# Patient Record
Sex: MALE | Race: WHITE | URBAN - METROPOLITAN AREA
[De-identification: names, ages, dates, MRNs, and addresses within clinical notes are randomized per-mention and may not be internally consistent; named-entity substitution may affect disease eponyms.]

---

## 2020-06-24 ENCOUNTER — HOSPITAL ENCOUNTER (OUTPATIENT)
Age: 50
Setting detail: SPECIMEN
Discharge: HOME | End: 2020-06-24
Payer: COMMERCIAL

## 2020-06-24 LAB — SARS-COV-2 ANTIBODY, TOTAL: NEGATIVE

## 2022-11-07 ENCOUNTER — NURSE TRIAGE (OUTPATIENT)
Dept: OTHER | Facility: CLINIC | Age: 52
End: 2022-11-07

## 2023-01-28 ENCOUNTER — HOSPITAL ENCOUNTER (EMERGENCY)
Age: 53
Discharge: HOME | End: 2023-01-28
Attending: EMERGENCY MEDICINE
Payer: COMMERCIAL

## 2023-01-28 ENCOUNTER — APPOINTMENT (OUTPATIENT)
Dept: CT IMAGING | Age: 53
End: 2023-01-28
Payer: COMMERCIAL

## 2023-01-28 VITALS
RESPIRATION RATE: 16 BRPM | WEIGHT: 185 LBS | DIASTOLIC BLOOD PRESSURE: 86 MMHG | HEART RATE: 64 BPM | BODY MASS INDEX: 26.48 KG/M2 | SYSTOLIC BLOOD PRESSURE: 150 MMHG | HEIGHT: 70 IN | TEMPERATURE: 97.8 F | OXYGEN SATURATION: 98 %

## 2023-01-28 DIAGNOSIS — R22.1 NECK MASS: Primary | ICD-10-CM

## 2023-01-28 LAB
ABSOLUTE EOS #: 0.1 K/UL (ref 0–0.4)
ABSOLUTE LYMPH #: 1 K/UL (ref 1–4.8)
ABSOLUTE MONO #: 0.4 K/UL (ref 0.1–1.2)
ALBUMIN SERPL-MCNC: 4.3 G/DL (ref 3.5–5.2)
ALBUMIN/GLOBULIN RATIO: 1.7 (ref 1–2.5)
ALP BLD-CCNC: 59 U/L (ref 40–129)
ALT SERPL-CCNC: 19 U/L (ref 5–41)
ANION GAP SERPL CALCULATED.3IONS-SCNC: 10 MMOL/L (ref 9–17)
AST SERPL-CCNC: 18 U/L
BASOPHILS # BLD: 1 % (ref 0–2)
BASOPHILS ABSOLUTE: 0 K/UL (ref 0–0.2)
BILIRUB SERPL-MCNC: 0.2 MG/DL (ref 0.3–1.2)
BUN BLDV-MCNC: 19 MG/DL (ref 6–20)
CALCIUM SERPL-MCNC: 8.8 MG/DL (ref 8.6–10.4)
CHLORIDE BLD-SCNC: 105 MMOL/L (ref 98–107)
CO2: 24 MMOL/L (ref 20–31)
CREAT SERPL-MCNC: 0.93 MG/DL (ref 0.7–1.2)
EOSINOPHILS RELATIVE PERCENT: 2 % (ref 1–4)
GFR SERPL CREATININE-BSD FRML MDRD: >60 ML/MIN/1.73M2
GLUCOSE BLD-MCNC: 131 MG/DL (ref 70–99)
HCT VFR BLD CALC: 41.8 % (ref 41–53)
HEMOGLOBIN: 13.8 G/DL (ref 13.5–17.5)
LYMPHOCYTES # BLD: 21 % (ref 24–44)
MCH RBC QN AUTO: 30.2 PG (ref 26–34)
MCHC RBC AUTO-ENTMCNC: 33 G/DL (ref 31–37)
MCV RBC AUTO: 91.5 FL (ref 80–100)
MONOCYTES # BLD: 8 % (ref 2–11)
PDW BLD-RTO: 15 % (ref 12.5–15.4)
PLATELET # BLD: 214 K/UL (ref 140–450)
PMV BLD AUTO: 8.2 FL (ref 6–12)
POTASSIUM SERPL-SCNC: 3.8 MMOL/L (ref 3.7–5.3)
RBC # BLD: 4.57 M/UL (ref 4.5–5.9)
SEG NEUTROPHILS: 68 % (ref 36–66)
SEGMENTED NEUTROPHILS ABSOLUTE COUNT: 3.1 K/UL (ref 1.8–7.7)
SODIUM BLD-SCNC: 139 MMOL/L (ref 135–144)
TOTAL PROTEIN: 6.9 G/DL (ref 6.4–8.3)
WBC # BLD: 4.7 K/UL (ref 3.5–11)

## 2023-01-28 PROCEDURE — 6360000004 HC RX CONTRAST MEDICATION: Performed by: EMERGENCY MEDICINE

## 2023-01-28 PROCEDURE — 85025 COMPLETE CBC W/AUTO DIFF WBC: CPT

## 2023-01-28 PROCEDURE — 70491 CT SOFT TISSUE NECK W/DYE: CPT

## 2023-01-28 PROCEDURE — 36415 COLL VENOUS BLD VENIPUNCTURE: CPT

## 2023-01-28 PROCEDURE — 80053 COMPREHEN METABOLIC PANEL: CPT

## 2023-01-28 PROCEDURE — 99285 EMERGENCY DEPT VISIT HI MDM: CPT

## 2023-01-28 PROCEDURE — 2580000003 HC RX 258: Performed by: EMERGENCY MEDICINE

## 2023-01-28 RX ORDER — 0.9 % SODIUM CHLORIDE 0.9 %
80 INTRAVENOUS SOLUTION INTRAVENOUS ONCE
Status: DISCONTINUED | OUTPATIENT
Start: 2023-01-28 | End: 2023-01-28 | Stop reason: HOSPADM

## 2023-01-28 RX ORDER — DOXYCYCLINE HYCLATE 50 MG/1
50 CAPSULE ORAL 2 TIMES DAILY
COMMUNITY

## 2023-01-28 RX ORDER — SODIUM CHLORIDE 0.9 % (FLUSH) 0.9 %
10 SYRINGE (ML) INJECTION PRN
Status: DISCONTINUED | OUTPATIENT
Start: 2023-01-28 | End: 2023-01-28 | Stop reason: HOSPADM

## 2023-01-28 RX ADMIN — Medication 80 ML: at 15:50

## 2023-01-28 RX ADMIN — SODIUM CHLORIDE, PRESERVATIVE FREE 10 ML: 5 INJECTION INTRAVENOUS at 15:50

## 2023-01-28 RX ADMIN — IOPAMIDOL 75 ML: 755 INJECTION, SOLUTION INTRAVENOUS at 15:49

## 2023-01-28 ASSESSMENT — ENCOUNTER SYMPTOMS
GASTROINTESTINAL NEGATIVE: 1
RESPIRATORY NEGATIVE: 1
EYES NEGATIVE: 1

## 2023-01-28 ASSESSMENT — LIFESTYLE VARIABLES: HOW OFTEN DO YOU HAVE A DRINK CONTAINING ALCOHOL: 4 OR MORE TIMES A WEEK

## 2023-01-28 ASSESSMENT — PAIN - FUNCTIONAL ASSESSMENT: PAIN_FUNCTIONAL_ASSESSMENT: NONE - DENIES PAIN

## 2023-01-28 NOTE — ED PROVIDER NOTES
[facility] Emergency Department  [id number] [facility] [address] [facility] [address]  Phone: [phone]  Fax: [phone]        Pt Name: Julia Holman  MRN: [id number]  [place] 1970  Date of evaluation: 23    [address] [place] [address]       Chief Complaint   Patient presents with    Neck Swelling       HISTORY OF PRESENT ILLNESS (Location/Symptom, Timing/Onset, Context/Setting, Quality, Duration, Modifying Factors, Severity)      Julia Holman is a 46 y.o. male with no pertinent PMH who presents to the ED via private auto with complaints of left-sided neck swelling which started about 2 days ago. Patient states that he does have a history of cystic acne, and he had popped a pimple behind his left side, on his neck. He states that is since healed. He denies any recent illness. Denies any sore throat. Denies any fever chills or body aches. Denies any headache dizziness or lightheadedness. He states is never had swelling like this in the past.  He does still have his tonsils. He denies any trouble swallowing or trouble breathing. Patient has not taken any medication for symptoms at this time. PAST MEDICAL / SURGICAL / SOCIAL / FAMILY HISTORY     PMH:  has a past medical history of Solitary kidney, congenital.  Surgical History:  has a past surgical history that includes Achilles tendon surgery (Right). Social History:  reports that he has never smoked. He has never used smokeless tobacco. He reports current alcohol use. Family History: He indicated that his mother is alive. He indicated that his father is . He indicated that both of his brothers are alive. He indicated that his maternal grandmother is . He indicated that his maternal grandfather is . He indicated that his paternal grandmother is . He indicated that his paternal grandfather is . family history includes Cancer in his father.   Psychiatric History: None    Allergies: Patient has no known allergies. Home Medications:   Prior to Admission medications    Medication Sig Start Date End Date Taking? Authorizing Provider   doxycycline (VIBRAMYCIN) 50 MG capsule Take 50 mg by mouth 2 times daily   Yes Historical Provider, MD   tamsulosin (FLOMAX) 0.4 MG capsule Take 2 capsules by mouth daily. 5/20/13 5/20/14  Emma Cisneros, APRN - CNP       REVIEW OF SYSTEMS  (2-9 systems for level 4, 10 ormore for level 5)      Review of Systems   Constitutional: Negative. HENT: Negative. Eyes: Negative. Respiratory: Negative. Cardiovascular: Negative. Gastrointestinal: Negative. Endocrine: Negative. Genitourinary: Negative. Musculoskeletal: Negative. Skin:         Left-sided neck and facial swelling   Neurological: Negative. Hematological: Negative. Psychiatric/Behavioral: Negative. All other systems negative except as marked. PHYSICAL EXAM  (up to 7 for level 4, 8 or more for level 5)      INITIAL VITALS:  height is 5' 10\" (1.778 m) and weight is 83.9 kg (185 lb). His oral temperature is 97.8 °F (36.6 °C). His blood pressure is 150/86 (abnormal) and his pulse is 64. His respiration is 16 and oxygen saturation is 98%. Vital signs reviewed. Physical Exam  Constitutional:       Appearance: Normal appearance. HENT:      Head: Normocephalic. Right Ear: Tympanic membrane, ear canal and external ear normal.      Left Ear: Tympanic membrane, ear canal and external ear normal.      Nose: Nose normal.      Mouth/Throat:      Mouth: Mucous membranes are moist.      Pharynx: Oropharynx is clear. Eyes:      Extraocular Movements: Extraocular movements intact. Conjunctiva/sclera: Conjunctivae normal.      Pupils: Pupils are equal, round, and reactive to light. Cardiovascular:      Rate and Rhythm: Normal rate and regular rhythm. Pulses: Normal pulses. Heart sounds: Normal heart sounds.    Pulmonary:      Effort: Pulmonary effort is normal.      Breath sounds: Normal breath sounds. Abdominal:      General: Bowel sounds are normal. There is no distension. Palpations: Abdomen is soft. Tenderness: There is no abdominal tenderness. There is no guarding. Musculoskeletal:         General: Normal range of motion. Cervical back: Normal range of motion. Skin:     General: Skin is warm and dry. Capillary Refill: Capillary refill takes less than 2 seconds. Findings: Erythema present. Neurological:      Mental Status: He is alert and oriented to person, place, and time. Psychiatric:         Mood and Affect: Mood normal.         Behavior: Behavior normal.         Thought Content: Thought content normal.         Judgment: Judgment normal.         DIFFERENTIAL DIAGNOSIS / MDM     Upon exam, He appears nontoxic and no distress is noted. Heart sounds within normal limits upon auscultation. Lung sounds are clear and equal bilaterally. Bowel sounds are present in all 4 quadrants. No abdominal distention tenderness or guarding is noted. Upon examination, he does have some swelling noted on the left side of his face, kind of over the parotid gland as well as over the left mandible submandibular glands. There is some erythema noted as well. Skin temp is appropriate. Oropharynx within normal limits, there are no lesions swelling or abscess noted. Patient's teeth are intact, no obvious dental abscess or decay is noted. I will order a soft tissue CT scan of the neck as well as some blood work and reassess. Patient denies need for any pain medication at this time is resting comfortably in his room. CT saying that there is a 16mm cystic left neck level 2 mass, presumed metastatic squamous cell carcinoma per just read. Spoke with Dr. Meghna Wells with oncology who is able like to see the patient in the office this week. Visit I will discuss the results with the patient and his significant other.   All of the questions were answered. Patient and his significant other state they will call oncologist office worsening Monday morning. Patient continues denies need for any pain medication at this time. Please return to emergency part with any trouble swallowing, drooling, respiratory distress, intractable nausea or vomiting, or any other new concerning or worsening symptoms. Patient and his significant other state and understanding of education and patient stable for outpatient follow-up this time. PLAN (LABS / IMAGING / EKG):  Orders Placed This Encounter   Procedures    CT SOFT TISSUE NECK W CONTRAST    CBC with Auto Differential    Comprehensive Metabolic Panel w/ Reflex to MG    Insert peripheral IV       MEDICATIONS ORDERED:  Orders Placed This Encounter   Medications    0.9 % sodium chloride bolus    iopamidol (ISOVUE-370) 76 % injection 75 mL    sodium chloride flush 0.9 % injection 10 mL       Controlled Substances Monitoring:     DIAGNOSTIC RESULTS     EKG: All EKG's are interpreted by the Emergency Department Physician who either signs or Co-signs this chart in the absenceof a cardiologist.      RADIOLOGY: All images are read by the radiologist and their interpretations are reviewed. CT SOFT TISSUE NECK W CONTRAST   Preliminary Result   60 mm cystic left neck level 2 mass, presumed metastatic squamous cell   carcinoma. Congenital second branchial cleft cyst is in the differential   diagnosis only if the lesion has been present for years. Mild asymmetric enlargement of the left palatine tonsil without measurable   primary mass. No results found.     LABS:  Results for orders placed or performed during the hospital encounter of 01/28/23   CBC with Auto Differential   Result Value Ref Range    WBC 4.7 3.5 - 11.0 k/uL    RBC 4.57 4.5 - 5.9 m/uL    Hemoglobin 13.8 13.5 - 17.5 g/dL    Hematocrit 41.8 41 - 53 %    MCV 91.5 80 - 100 fL    MCH 30.2 26 - 34 pg    MCHC 33.0 31 - 37 g/dL    RDW 15.0 12.5 - 15.4 % Platelets 335 871 - 357 k/uL    MPV 8.2 6.0 - 12.0 fL    Seg Neutrophils 68 (H) 36 - 66 %    Lymphocytes 21 (L) 24 - 44 %    Monocytes 8 2 - 11 %    Eosinophils % 2 1 - 4 %    Basophils 1 0 - 2 %    Segs Absolute 3.10 1.8 - 7.7 k/uL    Absolute Lymph # 1.00 1.0 - 4.8 k/uL    Absolute Mono # 0.40 0.1 - 1.2 k/uL    Absolute Eos # 0.10 0.0 - 0.4 k/uL    Basophils Absolute 0.00 0.0 - 0.2 k/uL   Comprehensive Metabolic Panel w/ Reflex to MG   Result Value Ref Range    Glucose 131 (H) 70 - 99 mg/dL    BUN 19 6 - 20 mg/dL    Creatinine 0.93 0.70 - 1.20 mg/dL    Est, Glom Filt Rate >60 >60 mL/min/1.73m2    Calcium 8.8 8.6 - 10.4 mg/dL    Sodium 139 135 - 144 mmol/L    Potassium 3.8 3.7 - 5.3 mmol/L    Chloride 105 98 - 107 mmol/L    CO2 24 20 - 31 mmol/L    Anion Gap 10 9 - 17 mmol/L    Alkaline Phosphatase 59 40 - 129 U/L    ALT 19 5 - 41 U/L    AST 18 <40 U/L    Total Bilirubin 0.2 (L) 0.3 - 1.2 mg/dL    Total Protein 6.9 6.4 - 8.3 g/dL    Albumin 4.3 3.5 - 5.2 g/dL    Albumin/Globulin Ratio 1.7 1.0 - 2.5       EMERGENCY DEPARTMENT COURSE           Vitals:    Vitals:    01/28/23 1430   BP: (!) 150/86   Pulse: 64   Resp: 16   Temp: 97.8 °F (36.6 °C)   TempSrc: Oral   SpO2: 98%   Weight: 83.9 kg (185 lb)   Height: 5' 10\" (1.778 m)     -------------------------  BP: (!) 150/86, Temp: 97.8 °F (36.6 °C), Heart Rate: 64, Resp: 16      RE-EVALUATION:  See ED Course notes above. CONSULTS:  None    PROCEDURES:  None    FINAL IMPRESSION      1. Neck mass          DISPOSITION / PLAN     CONDITION ON DISPOSITION:   Good / Stable for discharge. PATIENT REFERRED TO:  Shelli Charlton, APRN - CNP  [address] [facility]  [phone]    Schedule an appointment as soon as possible for a visit       [facility] Emergency Department  [address] [address]  [phone]  Go to   If symptoms worsen    Kirby MD Hiram  [address] [place] [address] [phone]    Call in 1 day      DISCHARGE MEDICATIONS:  Discharge Medication List as of 1/28/2023  5:21 PM          MARNI Ellis - NP   Emergency Medicine Nurse Practitioner    (Please note that portions of this note were completed with a voice recognition program.  Efforts were made to edit the dictations but occasionally words aremis-transcribed.)      MARNI Ellis NP  01/28/23 1821

## 2023-01-28 NOTE — ED PROVIDER NOTES
Emergency Department           COMPLAINT       Chief Complaint   Patient presents with    Neck Swelling      PHYSICAL EXAM      ED Triage Vitals [01/28/23 1430]   BP Temp Temp Source Heart Rate Resp SpO2 Height Weight   (!) 150/86 97.8 °F (36.6 °C) Oral 64 16 98 % 5' 10\" (1.778 m) 185 lb (83.9 kg)       Constitutional: Alert, oriented x3, nontoxic, answering questions appropriately, acting properly for age, in no acute distress   HEENT: Extraocular muscles intact, mucus membranes moist, TMs clear bilaterally, no posterior pharyngeal erythema or exudates, Pupils equal, round, reactive to light,   Neck: Trachea midline swelling to the left side of the parotid and submandibular glands. No meningismus no stridor  Cardiovascular: Regular rhythm and rate no murmurs   Respiratory: Clear to auscultation bilaterally no wheezes, rhonchi, rales, no respiratory distress no tachypnea no retractions no hypoxia  Gastrointestinal: Soft, nontender, nondistended, positive bowel sounds. No rebound, rigidity, or guarding.    Musculoskeletal: No extremity pain or swelling   Neurologic: Moving all 4 extremities without difficulty there are no gross focal neurologic deficits   Skin: Warm and dry   Physical Exam  DIAGNOSTIC RESULTS     EKG: All EKG's are interpreted by the Emergency Department Physician who either signs or Co-signs this chart in the absence of a cardiologist.    Not indicated unless otherwise documented above or in the midlevel documentation    LABS:  Results for orders placed or performed during the hospital encounter of 01/28/23   CBC with Auto Differential   Result Value Ref Range    WBC 4.7 3.5 - 11.0 k/uL    RBC 4.57 4.5 - 5.9 m/uL    Hemoglobin 13.8 13.5 - 17.5 g/dL    Hematocrit 41.8 41 - 53 %    MCV 91.5 80 - 100 fL    MCH 30.2 26 - 34 pg    MCHC 33.0 31 - 37 g/dL    RDW 15.0 12.5 - 15.4 %    Platelets 923 308 - 426 k/uL    MPV 8.2 6.0 - 12.0 fL    Seg Neutrophils 68 (H) 36 - 66 %    Lymphocytes 21 (L) 24 - 44 %    Monocytes 8 2 - 11 %    Eosinophils % 2 1 - 4 %    Basophils 1 0 - 2 %    Segs Absolute 3.10 1.8 - 7.7 k/uL    Absolute Lymph # 1.00 1.0 - 4.8 k/uL    Absolute Mono # 0.40 0.1 - 1.2 k/uL    Absolute Eos # 0.10 0.0 - 0.4 k/uL    Basophils Absolute 0.00 0.0 - 0.2 k/uL   Comprehensive Metabolic Panel w/ Reflex to MG   Result Value Ref Range    Glucose 131 (H) 70 - 99 mg/dL    BUN 19 6 - 20 mg/dL    Creatinine 0.93 0.70 - 1.20 mg/dL    Est, Glom Filt Rate >60 >60 mL/min/1.73m2    Calcium 8.8 8.6 - 10.4 mg/dL    Sodium 139 135 - 144 mmol/L    Potassium 3.8 3.7 - 5.3 mmol/L    Chloride 105 98 - 107 mmol/L    CO2 24 20 - 31 mmol/L    Anion Gap 10 9 - 17 mmol/L    Alkaline Phosphatase 59 40 - 129 U/L    ALT 19 5 - 41 U/L    AST 18 <40 U/L    Total Bilirubin 0.2 (L) 0.3 - 1.2 mg/dL    Total Protein 6.9 6.4 - 8.3 g/dL    Albumin 4.3 3.5 - 5.2 g/dL    Albumin/Globulin Ratio 1.7 1.0 - 2.5       Not indicated unless otherwise documented above or in the midlevel documentation    RADIOLOGY:   I reviewedthe radiologist interpretations:  CT SOFT TISSUE NECK W CONTRAST   Preliminary Result   60 mm cystic left neck level 2 mass, presumed metastatic squamous cell   carcinoma. Congenital second branchial cleft cyst is in the differential   diagnosis only if the lesion has been present for years. Mild asymmetric enlargement of the left palatine tonsil without measurable   primary mass. Not indicated unless otherwise documented above or in the midlevel documentation    EMERGENCY DEPARTMENT COURSE:     The patient was given the following medications:  Orders Placed This Encounter   Medications    0.9 % sodium chloride bolus    iopamidol (ISOVUE-370) 76 % injection 75 mL    sodium chloride flush 0.9 % injection 10 mL        PERTINENT ATTENDING PHYSICIAN COMMENTS:    2 days of left-sided facial and neck swelling. No difficulty breathing or difficulty swallowing. No injuries. No fevers or chills.   No other symptoms. CT soft tissue neck. Labs. 5:10 PM CAT scan shows a cystic mass most likely metastatic squamous cell carcinoma less likely a second brachial cleft cyst.  Case was discussed between oncology and Neel Drought patient will follow-up as an outpatient. Faculty Attestation    I performed a history and physical examination of the patient and discussed management with the mid level provideer. I reviewed the mid level provider's note and agree with the documented findings and plan of care. Any areas of disagreement are noted on the chart. I was personally present for the key portions of any procedures. I have documented in the chart those procedures where I was not present during the key portions. I have reviewed the emergency nurses triage note. I agree with the chief complaint, past medical history, past surgical history, allergies, medications, social and family history as documented unless otherwise noted below. Documentation of the HPI, Physical Exam and Medical Decision Making performed by medical students or scribes is based on my personal performance of the HPI, PE and MDM. For Physician Assistant/ Nurse Practitioner cases/documentation I have personally evaluated this patient and have completed at least one if not all key elements of the E/M (history, physical exam, and MDM). Additional findings are as noted.       Belem White DO  01/28/23 8646

## 2023-02-03 ENCOUNTER — TELEPHONE (OUTPATIENT)
Dept: ONCOLOGY | Age: 53
End: 2023-02-03

## 2023-02-03 ENCOUNTER — INITIAL CONSULT (OUTPATIENT)
Dept: ONCOLOGY | Age: 53
End: 2023-02-03
Payer: COMMERCIAL

## 2023-02-03 VITALS
SYSTOLIC BLOOD PRESSURE: 142 MMHG | DIASTOLIC BLOOD PRESSURE: 87 MMHG | WEIGHT: 192.1 LBS | HEART RATE: 54 BPM | BODY MASS INDEX: 27.56 KG/M2 | TEMPERATURE: 98 F

## 2023-02-03 DIAGNOSIS — B97.7: ICD-10-CM

## 2023-02-03 DIAGNOSIS — J38.3: ICD-10-CM

## 2023-02-03 DIAGNOSIS — C09.9 SQUAMOUS CELL CARCINOMA OF LEFT TONSIL (HCC): Primary | ICD-10-CM

## 2023-02-03 DIAGNOSIS — R59.0 CERVICAL LYMPHADENOPATHY: ICD-10-CM

## 2023-02-03 PROCEDURE — 99205 OFFICE O/P NEW HI 60 MIN: CPT | Performed by: INTERNAL MEDICINE

## 2023-02-03 RX ORDER — ACETAMINOPHEN 325 MG/1
650 TABLET ORAL EVERY 6 HOURS PRN
COMMUNITY

## 2023-02-03 NOTE — TELEPHONE ENCOUNTER
AVS from 2/3/23      Ct guided bx of LN  PET CT  ENT evaluation  Nurse navigator    *rv is sched TBD  *PET sched for [email or web address]  *Navigator communicating with Ent for referral  IR emailed for bx referral    PT was given AVS and appt schedule

## 2023-02-03 NOTE — TELEPHONE ENCOUNTER
Soniya Colvin, [facility] [place], alerted writer to pt's case & stated Dr. Yris Jackson placed referral for oncology navigation. Name: Kelly Addison  : 1970  MRN: [id number]    Oncology Navigation- Initial Note:    Intake-  Contact Type: Medical Oncology    Continuum of Care: Diagnosis/Active Treatment    Notes: Met with pt & pt's  S.O., Tiffanie Olson, notified Dr. Yris Jackson requesting oncology navigation. Discussed potential barriers to care, pt stated recently laid off & unsure of COBRA insurance. Pt stated recently signed up for [facility] insurance r/t awaits to hear back from employer. In[facility]ed pt [facility] . Pt stated received  FAA's from [facility] check in. Pt c/o dysphagia & denied weight loss. Inquired on dental health. Pt stated has own teeth & sees dentist @ [place] [address].  Pt stated last seen 3 months ago & x-rays completed 6 months ago. Inquired on smoking/alcohol/drug use. Pt stated never smoked, drinks 2-3 beers daily, & denied drug use. [facility] written materials along w/writer's business card given to pt. Pt escorted to check out. Maria Ines Bahena, [facility] check out, alerted writer referral placed to Dr. Thelma Portillo. Notified Kathryn Cesar will contact Oumou BARBOZA, Dr. Yaakov Valdez office, to expedite referral.  Instructed pt writer will update with appt details asap. Attempted to contact Oumou, unavailable, message left to contact writer. Tristin updated on pt. Oumou  called back, updated on pt & referral.  Oumou requested referral along w/records faxed to [phone]. Oumou scheduled Dr. Thelma Portillo consult   @ [phone]. Tavia updated on conversation w/Oumou & requested referral along w/records faxed as requested. Pt updated on Dr. Thelma Portillo appt details, verbalized understanding & denied questions/concerns. Encouraged pt to contact writer prn. Will continue to follow.     Electronically signed by Jamila Garza RN on 2/3/2023 at 11:17 AM

## 2023-02-06 ENCOUNTER — TELEPHONE (OUTPATIENT)
Dept: ONCOLOGY | Age: 53
End: 2023-02-06

## 2023-02-06 ENCOUNTER — TELEPHONE (OUTPATIENT)
Dept: INTERVENTIONAL RADIOLOGY/VASCULAR | Age: 53
End: 2023-02-06

## 2023-02-06 NOTE — TELEPHONE ENCOUNTER
Name: Unruly Reece  : 1970  MRN: [id number]    Oncology Navigation Follow-Up Note    Contact Type:  Telephone    Notes: Pt left VM stating spoke with Bret Villarreal to inquire on xrays. Pt stated panoramic xrays completed  & regular xrays 2022. Pt inquired on scheduling bx. Spoke with Shayla Willams IR , to inquire on bx.  Daquan Roper stated awaits IR MD approval to schedule. Pt updated on conversation w/Fiorella. Pt verbalized understanding. Pt stated awaits call from [facility] . Instructed pt may contact Tristin directly & contact # given. Pt verbalized understanding & denied questions/concerns. Instructed pt may contact writer prn. Will continue to follow.     Electronically signed by Delma Trujillo RN on 2023 at 11:25 AM

## 2023-02-06 NOTE — TELEPHONE ENCOUNTER
received referral from Nurse Navigator.  called patient who states he was laid off last Friday from his job and his insurance ended 1/31. Patient states he signed up for insurance through [employer] which was active 2/1. Patient waiting on paperwork for COBRA to make decision on which insurance he will keep for remainder of year.  encouraged patient to reach out to discuss further once he has information. Patient states he also has a policy that will kick in if given cancer diagnosis and is looking further into that.

## 2023-02-08 ENCOUNTER — HOSPITAL ENCOUNTER (OUTPATIENT)
Dept: NUCLEAR MEDICINE | Age: 53
Discharge: HOME | End: 2023-02-10
Payer: COMMERCIAL

## 2023-02-08 DIAGNOSIS — R59.0 CERVICAL LYMPHADENOPATHY: ICD-10-CM

## 2023-02-08 LAB — GLUCOSE BLD-MCNC: 92 MG/DL (ref 75–110)

## 2023-02-08 PROCEDURE — A9552 F18 FDG: HCPCS | Performed by: INTERNAL MEDICINE

## 2023-02-08 PROCEDURE — 3430000000 HC RX DIAGNOSTIC RADIOPHARMACEUTICAL: Performed by: INTERNAL MEDICINE

## 2023-02-08 PROCEDURE — 78815 PET IMAGE W/CT SKULL-THIGH: CPT

## 2023-02-08 PROCEDURE — 2580000003 HC RX 258: Performed by: INTERNAL MEDICINE

## 2023-02-08 PROCEDURE — 82947 ASSAY GLUCOSE BLOOD QUANT: CPT

## 2023-02-08 RX ORDER — SODIUM CHLORIDE 0.9 % (FLUSH) 0.9 %
10 SYRINGE (ML) INJECTION PRN
Status: DISCONTINUED | OUTPATIENT
Start: 2023-02-08 | End: 2023-02-11 | Stop reason: HOSPADM

## 2023-02-08 RX ORDER — FLUDEOXYGLUCOSE F 18 200 MCI/ML
10 INJECTION, SOLUTION INTRAVENOUS
Status: COMPLETED | OUTPATIENT
Start: 2023-02-08 | End: 2023-02-08

## 2023-02-08 RX ADMIN — FLUDEOXYGLUCOSE F 18 10.74 MILLICURIE: 200 INJECTION, SOLUTION INTRAVENOUS at 13:17

## 2023-02-08 RX ADMIN — SODIUM CHLORIDE, PRESERVATIVE FREE 10 ML: 5 INJECTION INTRAVENOUS at 13:17

## 2023-02-09 ENCOUNTER — TELEPHONE (OUTPATIENT)
Dept: ONCOLOGY | Age: 53
End: 2023-02-09

## 2023-02-09 NOTE — TELEPHONE ENCOUNTER
Name: Cami Linton  : 1970  MRN: [id number]    Oncology Navigation Follow-Up Note    Contact Type:  Telephone    Notes: Mariam Mack, pt's S.O., called in stating pt completed PET/CT scan & Dr. Yuri Dunn consult yesterday. Mariam Mack stated pt scheduled for DL w/bx today @ [facility] today. Dalia Forrest will update Dr. Jace Torres & cancel IR bx tomorrow. Dr. Jace Torres updated on pt, ok to cancel IR bx, & requested pt scheduled for f/u @ [facility] on 2/15. Casimiro Mon, [facility] , updated on pt & requested bx canceled. Lorie Thibodeaux, [facility] , updated on pt & requested appt. Pt scheduled 2/15 @ 0930. Mariam Mack updated on 2/15 appt details. Mariam Mack verbalized understanding & denied questions/concerns. Will continue to follow.     Electronically signed by Bessie Tobias RN on 2023 at 11:43 AM

## 2023-02-14 ENCOUNTER — TELEPHONE (OUTPATIENT)
Dept: ONCOLOGY | Age: 53
End: 2023-02-14

## 2023-02-14 NOTE — TELEPHONE ENCOUNTER
received call from patient. Patient wanting to discuss insurance in person.  will check in at tomorrow's appointment.

## 2023-02-15 ENCOUNTER — TELEPHONE (OUTPATIENT)
Dept: ONCOLOGY | Age: 53
End: 2023-02-15

## 2023-02-15 ENCOUNTER — OFFICE VISIT (OUTPATIENT)
Dept: ONCOLOGY | Age: 53
End: 2023-02-15
Payer: COMMERCIAL

## 2023-02-15 VITALS
TEMPERATURE: 98.1 F | RESPIRATION RATE: 16 BRPM | DIASTOLIC BLOOD PRESSURE: 80 MMHG | WEIGHT: 189.5 LBS | HEART RATE: 48 BPM | BODY MASS INDEX: 27.13 KG/M2 | SYSTOLIC BLOOD PRESSURE: 133 MMHG | HEIGHT: 70 IN

## 2023-02-15 DIAGNOSIS — C09.9 SQUAMOUS CELL CARCINOMA OF LEFT TONSIL (HCC): Primary | ICD-10-CM

## 2023-02-15 DIAGNOSIS — B97.7 HPV IN MALE: ICD-10-CM

## 2023-02-15 DIAGNOSIS — R59.0 CERVICAL LYMPHADENOPATHY: ICD-10-CM

## 2023-02-15 PROCEDURE — 99215 OFFICE O/P EST HI 40 MIN: CPT | Performed by: INTERNAL MEDICINE

## 2023-02-15 PROCEDURE — 99211 OFF/OP EST MAY X REQ PHY/QHP: CPT | Performed by: INTERNAL MEDICINE

## 2023-02-15 NOTE — TELEPHONE ENCOUNTER
AVS from 2/15/23      Nephrology consult  IR consult for Port-A-Cath  Radiation oncology consult      Referral faxed to [phone] with confirmation    Notified IR for desean cath    Pt was given AVS and appointment schedule    Electronically signed by Duane Ortiz on 2/15/2023 at 3:44 PM

## 2023-02-15 NOTE — TELEPHONE ENCOUNTER
Name: Estrella Castaneda  : 1970  MRN: [id number]    Oncology Navigation Follow-Up Note    Contact Type:  Medical Oncology    Notes: Pt & pt's S.O., Oley Bosworth, @ [facility] for Dr. Sofia Sharma f/u. Accompanied Dr. Sofia Sharma in exam room. Dr. Sofia Sharma spoke with pt & Oley Bosworth r/t dx, prognosis, tx plan concurrent chemo/XRT, awaits call from Dr. Emerson Rodriguez to discuss pt's case, port placement, RO referral, & nephrology referral for chemotherapy clearance r/t one kidney. Pt & Oley Bosworth with numerous good questions, all addressed by Dr. Sofia Sharma. Instructed pt & Tian Bajwa will follow closely & may contact prn. Dr. Sofia Sharma alerted writer spoke with Dr. Emerson Rodriguez. Per Dr. Sofia Sharma, Dr. Emerson Rodriguez referred pt to Dr. Karl Johnson for possible surgical intervention. Dr. Sofia Sharma request port placement held until Dr. Hudson Ruiz final recommendation. Spoke with pt & Oley Bosworth, notified port placement to be held until pt completes Dr. Karl Johnson consultation & will proceed with referrals. Pt & Oley Bosworth verbalized understanding & denied questions/concerns. Attempted to contact Som Barton, [facility] , no answer, VM left notified port placement on hold. Will continue to follow.     Electronically signed by Georgina Zarate RN on 2/15/2023 at 12:50 PM

## 2023-02-15 NOTE — TELEPHONE ENCOUNTER
met with patient and significant other before Medical Oncology appointment.  discussed insurance options with them and went over [employer].  looking into other assistance.

## 2023-02-16 ENCOUNTER — HOSPITAL ENCOUNTER (OUTPATIENT)
Dept: RADIATION ONCOLOGY | Age: 53
Discharge: HOME | End: 2023-02-16
Payer: COMMERCIAL

## 2023-02-16 ENCOUNTER — TELEPHONE (OUTPATIENT)
Dept: ONCOLOGY | Age: 53
End: 2023-02-16

## 2023-02-16 VITALS
WEIGHT: 186.8 LBS | DIASTOLIC BLOOD PRESSURE: 77 MMHG | BODY MASS INDEX: 26.8 KG/M2 | RESPIRATION RATE: 16 BRPM | HEART RATE: 59 BPM | TEMPERATURE: 97.9 F | SYSTOLIC BLOOD PRESSURE: 130 MMHG

## 2023-02-16 DIAGNOSIS — C09.9 SQUAMOUS CELL CARCINOMA OF LEFT TONSIL (HCC): Primary | ICD-10-CM

## 2023-02-16 PROCEDURE — 99214 OFFICE O/P EST MOD 30 MIN: CPT | Performed by: RADIOLOGY

## 2023-02-16 ASSESSMENT — PAIN SCALES - GENERAL: PAINLEVEL_OUTOF10: 4

## 2023-02-16 ASSESSMENT — PAIN DESCRIPTION - LOCATION: LOCATION: THROAT

## 2023-02-16 NOTE — TELEPHONE ENCOUNTER
Name: Sharita Roque  : 1970  MRN: [id number]    Oncology Navigation Follow-Up Note    Contact Type:  Telephone    Notes: Upon review of chart & Epic care everywhere noted pt not scheduled for Dr. Lenin Sullivan consult. Spoke with Golden Ramos., [facility] oncology navigation, requested contact Dr. Shruthi Cosme's office to expedite referral.  Noted pt scheduled  for consult w/Dr. Geanie Mohs of [facility], &  for Dr. Elana Bernstein consult. Spoke with pt updated on findings & notified Chan Vanessa attempting to expedite nephrology referral.  Pt inquired on referral to [facility] to discuss possible proton therapy. Requested pt discuss w/Dr. Arash Allen @ today's consultation & notified Peyton Balderas will follow up. Pt verbalized understanding & denied questions/concerns. Instructed pt may contact writer prn. Will continue to follow.     Electronically signed by Nida Kyle RN on 2023 at 11:20 AM

## 2023-02-16 NOTE — PROGRESS NOTES
Referring Physician: Dr. Hwang Leora:    02/16/23 1345   BP: 130/77   Pulse: 59   Resp: 16   Temp: 97.9 °F (36.6 °C)    :     Pain Assessment: 0-10  Pain Level: 4       Wt Readings from Last 1 Encounters:   02/16/23 186 lb 12.8 oz (84.7 kg)        Body mass index is 26.8 kg/m². Immunizations:    Influenza status:    [x]   Current   []   Patient declined    Pneumococcal status:  []   Current  [x]   Patient declined  Covid status:   [x]  Dose #1:                     [x]  Dose #2:     [x]  Booster:               []  Patient declined    Smoking Status:    [] Smoker - PPD:   [] Nonsmoker - Quit Date:               [x] Never a smoker      No chief complaint on file. Cancer Staging  No matching staging information was found for the patient. Prior Radiation Therapy? No   If yes, site treated:   Facility:                             Date:    Concurrent Chemo/radiation? Yes/TBD   If yes, start date:    Prior Chemotherapy? No   If yes    Facility:                             Date:    Prior Hormonal Therapy or Contraceptive Medications? No   If yes,  Medication:                                Duration:    Head and Neck Cancer? Yes   If yes, please remind physician to place swallow study order and speech therapy order. Pacemaker/Defibrillator/ICD:  No    Do you have any musculoskeletal diseases, Lupus or arthritic conditions? No   If yes, are you currently taking Methotrexate? []  Yes  [x]  No                   Current Outpatient Medications   Medication Sig Dispense Refill    acetaminophen (TYLENOL) 325 MG tablet Take 650 mg by mouth every 6 hours as needed for Pain      doxycycline (VIBRAMYCIN) 50 MG capsule Take 50 mg by mouth 2 times daily (Patient not taking: Reported on 2/15/2023)      tamsulosin (FLOMAX) 0.4 MG capsule Take 2 capsules by mouth daily. (Patient not taking: Reported on 2/3/2023) 60 capsule 5     No current facility-administered medications for this encounter. Past Medical History:   Diagnosis Date    Head and neck cancer ([facility] [address]     Herpes     Solitary kidney, congenital        Past Surgical History:   Procedure Laterality Date    ACHILLES TENDON SURGERY Right        Family History   Problem Relation Age of Onset    Cancer Father        Social History     Socioeconomic History    Marital status:      Spouse name: Not on file    Number of children: Not on file    Years of education: Not on file    Highest education level: Not on file   Occupational History    Not on file   Tobacco Use    Smoking status: Never    Smokeless tobacco: Never   Substance and Sexual Activity    Alcohol use: Yes     Comment: 2-3 BEERS DAILY    Drug use: Never    Sexual activity: Yes     Partners: Female   Other Topics Concern    Not on file   Social History Narrative    Not on file     Social Determinants of Health     Financial Resource Strain: Not on file   Food Insecurity: Not on file   Transportation Needs: Not on file   Physical Activity: Not on file   Stress: Not on file   Social Connections: Not on file   Intimate Partner Violence: Not on file   Housing Stability: Not on file             FALLS RISK SCREEN  Instructions:  Assess the patient and enter the appropriate indicators that are present for fall risk identification. Total the numbers entered and assign a fall risk score from Table 2.  Reassess patient at a minimum every 12 weeks or with status change. Assessment   Date  2/16/2023     1. Mental Ability: confusion/cognitively impaired 0     2. Elimination Issues: incontinence, frequency 0       3. Ambulatory: use of assistive devices (walker, cane, off-loading devices),        attached to equipment (IV pole, oxygen) 0     4. Sensory Limitations: dizziness, vertigo, impaired vision 0     5. Age less than 65        0     6. Age 72 or greater 0     7. Medication: diuretics, strong analgesics, hypnotics, sedatives,        antihypertensive agents 0   8.   Falls: recent history of falls within the last 3 months (not to include slipping or        tripping) 0   TOTAL 0    If score of 4 or greater was education given? N/A       TABLE 2   Risk Score Risk Level Plan of Care   0-3 Little or  No Risk 1. Provide assistance as indicated for ambulation activities  2. Reorient confused/cognitively impaired patient  3. Call-light/bell within patient's reach  4. Chair/bed in low position, stretcher/bed with siderails up except when performing patient care activities  5. Educate patient/family/caregiver on falls prevention  6.  Reassess in 12 weeks or with any noted change in patient condition which places them at a risk for a fall   4-6 Moderate Risk 1. Provide assistance as indicated for ambulation activities  2. Reorient confused/cognitively impaired patient  3. Call-light/bell within patient's reach  4. Chair/bed in low position, stretcher/bed with siderails up except when performing patient care activities  5. Educate patient/family/caregiver on falls prevention     7 or   Higher High Risk 1. Place patient in easily observable treatment room  2. Patient attended at all times by family member or staff  3. Provide assistance as indicated for ambulation activities  4. Reorient confused/cognitively impaired patient  5. Call-light/bell within patient's reach  6. Chair/bed in low position, stretcher/bed with siderails up except when performing patient care activities  7. Educate patient/family/caregiver on falls prevention             Assessment/Plan: Patient was seen today for consultation. Patient here with supportive Danisha Rodríguez. States he is \"feeling urgency in getting things going. \"  He has noticeable swelling to his left face/neck. He states his pain is 4/10 and that he does not take any medication nor require OTC medication for this discomfort. He was recently laid off in January and moved in with [employer].   He has previously connected with  here due to insurance concerns with recently being laid off in January. He states he has seen Dr. Kirit Kimble, dentist at [facility], and that he is moving his care to a [place] [address] office in [facility]. States he was seen last around December. Patient had several questions and these were answered with some being deferred to Dr. Fredrick Chua. He is concerned about treatments and having one kidney. Also requested referral to [facility] for Proton Therapy and Dr. Fredrick Chua updated. Per Dr. Fredrick Chua, pt to return for follow up after surgery. Will request dental clearance at that time. Patient Pain Score:  4      Reassessment of pain at future time    Goal: Verbalizes adequate pain control with oral medications if needed. Current Status:  Continue to re-evaluate.            Patricia Lyons RN 2/16/2023 2:04 PM

## 2023-02-16 NOTE — PROGRESS NOTES
Patient ID: Stephan Rodriguez, 1970, [id number], 46 y.o. Referred by :  No ref. provider found   Reason for consultation: Left cervical lymph node      HISTORY OF PRESENT ILLNESS:    Oncologic History:    Stephan Rodriguez is a very pleasant 46 y.o. male. 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 Congenital second branchial cleft cyst is in the differential   diagnosis only if the lesion has been present for years. Mild asymmetric enlargement of the left palatine tonsil without measurable   primary mass. Past Medical History:   Diagnosis Date    Head and neck cancer ([address]     Herpes     Solitary kidney, congenital        Past Surgical History:   Procedure Laterality Date    ACHILLES TENDON SURGERY Right        No Known Allergies    Current Outpatient Medications   Medication Sig Dispense Refill    acetaminophen (TYLENOL) 325 MG tablet Take 650 mg by mouth every 6 hours as needed for Pain      doxycycline (VIBRAMYCIN) 50 MG capsule Take 50 mg by mouth 2 times daily (Patient not taking: Reported on 2/15/2023)      tamsulosin (FLOMAX) 0.4 MG capsule Take 2 capsules by mouth daily. (Patient not taking: Reported on 2/3/2023) 60 capsule 5     No current facility-administered medications for this visit. Social History     Socioeconomic History    Marital status:      Spouse name: Not on file    Number of children: Not on file    Years of education: Not on file    Highest education level: Not on file   Occupational History    Not on file   Tobacco Use    Smoking status: Never    Smokeless tobacco: Never   Substance and Sexual Activity    Alcohol use:  Yes Comment: 2-3 BEERS DAILY    Drug use: Never    Sexual activity: Yes     Partners: Female   Other Topics Concern    Not on file   Social History Narrative    Not on file     Social Determinants of Health     Financial Resource Strain: Not on file   Food Insecurity: Not on file   Transportation Needs: Not on file   Physical Activity: Not on file   Stress: Not on file   Social Connections: Not on file   Intimate Partner Violence: Not on file   Housing Stability: Not on file       Family History   Problem Relation Age of Onset    Cancer Father         REVIEW OF SYSTEM:     Constitutional: No fever or chills. No night sweats, no weight loss   Eyes: No eye discharge, double vision, or eye pain   HEENT: negative for sore mouth, sore throat, hoarseness and voice change   Respiratory: negative for cough , sputum, dyspnea, wheezing, hemoptysis, chest pain   Cardiovascular: negative for chest pain, dyspnea, palpitations, orthopnea, PND   Gastrointestinal: negative for nausea, vomiting, diarrhea, constipation, abdominal pain, Dysphagia, hematemesis and hematochezia   Genitourinary: negative for frequency, dysuria, nocturia, urinary incontinence, and hematuria   Integument: negative for rash, skin lesions, bruises.    Hematologic/Lymphatic: negative for easy bruising, bleeding, lymphadenopathy, petechiae and swelling/edema   Endocrine: negative for heat or cold intolerance, tremor, weight changes, change in bowel habits and hair loss   Musculoskeletal: negative for myalgias, arthralgias, pain, joint swelling,and bone pain   Neurological: negative for headaches, dizziness, seizures, weakness, numbness       OBJECTIVE:         Vitals:    02/03/23 0922   BP: (!) 142/87   Pulse: 54   Temp: 98 °F (36.7 °C)       PHYSICAL EXAM:   General appearance - well appearing, no in pain or distress   Mental status - alert and cooperative   Eyes - pupils equal and reactive, extraocular eye movements intact   Ears - bilateral TM's and external ear canals normal   Mouth - mucous membranes moist, pharynx normal without lesions   Neck - supple, left cervical node up to 6 cm tender no axillary lymphadenopathy  Lymphatics -palpable lymphadenopathy on the left cervical area no other lymphadenopathy appreciated no hepatosplenomegaly   Chest - clear to auscultation, no wheezes, rales or rhonchi, symmetric air entry   Heart - normal rate, regular rhythm, normal S1, S2, no murmurs, rubs, clicks or gallops   Abdomen - soft, nontender, nondistended, no masses or organomegaly   Neurological - alert, oriented, normal speech, no focal findings or movement disorder noted   Musculoskeletal - no joint tenderness, deformity or swelling   Extremities - peripheral pulses normal, no pedal edema, no clubbing or cyanosis   Skin - normal coloration and turgor, no rashes, no suspicious skin lesions noted ,      LABORATORY DATA:     Lab Results   Component Value Date    WBC 4.7 01/28/2023    HGB 13.8 01/28/2023    HCT 41.8 01/28/2023    MCV 91.5 01/28/2023     01/28/2023    LYMPHOPCT 21 (L) 01/28/2023    RBC 4.57 01/28/2023    MCH 30.2 01/28/2023    MCHC 33.0 01/28/2023    RDW 15.0 01/28/2023    MONOPCT 8 01/28/2023    BASOPCT 1 01/28/2023    NEUTROABS 3.10 01/28/2023    LYMPHSABS 1.00 01/28/2023    MONOSABS 0.40 01/28/2023    EOSABS 0.10 01/28/2023    BASOSABS 0.00 01/28/2023         Chemistry        Component Value Date/Time     01/28/2023 1500    K 3.8 01/28/2023 1500     01/28/2023 1500    CO2 24 01/28/2023 1500    BUN 19 01/28/2023 1500    CREATININE 0.93 01/28/2023 1500        Component Value Date/Time    CALCIUM 8.8 01/28/2023 1500    ALKPHOS 59 01/28/2023 1500    AST 18 01/28/2023 1500    ALT 19 01/28/2023 1500    BILITOT 0.2 (L) 01/28/2023 1500            PATHOLOGY DATA:         IMAGING DATA:          ASSESSMENT:       Diagnosis Orders   1.  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 Cervical lymphadenopathy  Referral to Interventional Radiology RICHARD Alfredo MD, Otolaryngology, [facility]    Ambulatory Referral to Oncology Nurse Navigator      3. HPV infection of vocal cords                 PLAN:     I personally reviewed results of lab work-up imaging studies,outside records and other relevant clinical data. I had a detailed discussion with the patient. I explained the significance of these abnormalities and possible etiology and management options   Left cervical lymphadenopathy highly suspicious for metastatic squamous cell carcinoma of the head and neck with tonsillar abnormality on the CT concern about primary tonsillar cancer, discussed with the patient the natural history of tonsillar cancer in general and in his case in particular where likely cervical lymphadenopathy metastasis however would like to obtain more staging work-up with a PET/CT and refer to ENT for direct visualization  RTC after above       I spent a total of 60 minutes on the date of the service which included preparing to see the patient, face-to-face patient care, completing clinical documentation, obtaining and/or reviewing separately obtained history, performing a medically appropriate examination, counseling and educating the patient/family/caregiver and ordering medications, tests, or procedures. [facility]                            This note is created with the assistance of a speech recognition program.  While intending to generate a document that actually reflects the content of the visit, the document can still have some errors including those of syntax and sound a like substitutions which may escape proof reading. It such instances, actual meaning can be extrapolated by contextual diversion.

## 2023-02-16 NOTE — PROGRESS NOTES
Patient ID: Abad Aguilar, 1970, [id number], 46 y.o. Referred by :  No ref. provider found   Reason for consultation: Left cervical lymph node      HISTORY OF PRESENT ILLNESS:    Oncologic History:    Abad Aguilar is a very pleasant 46 y.o. male. 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 Congenital second branchial cleft cyst is in the differential   diagnosis only if the lesion has been present for years. Mild asymmetric enlargement of the left palatine tonsil without measurable   primary mass. PET/CT on February 8, 2023    1. Asymmetric radiotracer metabolically active focus in the left tonsillar   area attenuating the left vallecula, consistent with neoplasia. 2.  Cystic left neck mass is noted, photopenic centrally without FDG uptake. Walls are nodular and demonstrate areas of mild to moderate FDG uptake. Findings likely related to a necrotic metabolically active lymph node. Location adjacent to tonsillar metabolic lesion suspicious for metastasis. No other areas of metabolic activity noted within the cervical lymph nodes. 3.  Congenital absence of right kidney. Small calcification in the right   adrenal gland without metabolic activity. Patient underwent direct visualization by ENT 2/9/23       OPERATION PERFORMED:  1. Direct laryngoscopy with biopsies. 2. Biopsy of left oropharynx. SURGEON: Brielle Harding MD.    ANESTHESIA: General endotracheal.    ESTIMATED BLOOD LOSS: Minimal.    OPERATIVE FINDINGS: He was noted to have a 6 cm mass in the left  upper cervical neck, consistent with a metastatic lymph node.  He  was also noted to have a firm mass within the left tonsil. No  other lesions were seen. Biopsies were taken of the tonsil and  the vallecula. Pathology    Final Pathologic Diagnosis   1. Left tonsil, biopsy:     Squamous cell carcinoma, HPV associated. Comment: Immunostains were performed to help evaluate the carcinoma and P40 and P16 are diffuse strong positive, supportive of HPV-associated squamous cell carcinoma. All controls are adequate. 2.  Left vallecula, biopsy:   Benign squamous mucosa with focal chronic inflammation and reactive changes. Past Medical History:   Diagnosis Date    Head and neck cancer ([facility] [address]     Herpes     Solitary kidney, congenital        Past Surgical History:   Procedure Laterality Date    ACHILLES TENDON SURGERY Right        No Known Allergies    Current Outpatient Medications   Medication Sig Dispense Refill    acetaminophen (TYLENOL) 325 MG tablet Take 650 mg by mouth every 6 hours as needed for Pain      doxycycline (VIBRAMYCIN) 50 MG capsule Take 50 mg by mouth 2 times daily (Patient not taking: Reported on 2/15/2023)      tamsulosin (FLOMAX) 0.4 MG capsule Take 2 capsules by mouth daily. (Patient not taking: Reported on 2/3/2023) 60 capsule 5     No current facility-administered medications for this visit.        Social History     Socioeconomic History    Marital status:      Spouse name: Not on file    Number of children: Not on file    Years of education: Not on file    Highest education level: Not on file   Occupational History    Not on file   Tobacco Use    Smoking status: Never    Smokeless tobacco: Never   Substance and Sexual Activity    Alcohol use: Yes     Comment: 2-3 BEERS DAILY    Drug use: Never    Sexual activity: Yes     Partners: Female   Other Topics Concern    Not on file   Social History Narrative    Not on file     Social Determinants of Health     Financial Resource Strain: Not on file   Food Insecurity: Not on file   Transportation Needs: Not on file   Physical Activity: Not on file   Stress: Not on file   Social Connections: Not on file   Intimate Partner Violence: Not on file   Housing Stability: Not on file       Family History   Problem Relation Age of Onset    Cancer Father         REVIEW OF SYSTEM:     Constitutional: No fever or chills. No night sweats, no weight loss   Eyes: No eye discharge, double vision, or eye pain   HEENT: negative for sore mouth, sore throat, hoarseness and voice change   Respiratory: negative for cough , sputum, dyspnea, wheezing, hemoptysis, chest pain   Cardiovascular: negative for chest pain, dyspnea, palpitations, orthopnea, PND   Gastrointestinal: negative for nausea, vomiting, diarrhea, constipation, abdominal pain, Dysphagia, hematemesis and hematochezia   Genitourinary: negative for frequency, dysuria, nocturia, urinary incontinence, and hematuria   Integument: negative for rash, skin lesions, bruises.    Hematologic/Lymphatic: negative for easy bruising, bleeding, lymphadenopathy, petechiae and swelling/edema   Endocrine: negative for heat or cold intolerance, tremor, weight changes, change in bowel habits and hair loss   Musculoskeletal: negative for myalgias, arthralgias, pain, joint swelling,and bone pain   Neurological: negative for headaches, dizziness, seizures, weakness, numbness       OBJECTIVE:         Vitals:    02/15/23 0922   BP: 133/80   Pulse: (!) 48   Resp: 16   Temp: 98.1 °F (36.7 °C)       PHYSICAL EXAM:   General appearance - well appearing, no in pain or distress   Mental status - alert and cooperative   Eyes - pupils equal and reactive, extraocular eye movements intact   Ears - bilateral TM's and external ear canals normal   Mouth - mucous membranes moist, pharynx normal without lesions   Neck - supple, left cervical node up to 6 cm tender no axillary lymphadenopathy  Lymphatics -palpable lymphadenopathy on the left cervical area no other lymphadenopathy appreciated no hepatosplenomegaly   Chest - clear to auscultation, no wheezes, rales or rhonchi, symmetric air entry   Heart - normal rate, regular rhythm, normal S1, S2, no murmurs, rubs, clicks or gallops   Abdomen - soft, nontender, nondistended, no masses or organomegaly   Neurological - alert, oriented, normal speech, no focal findings or movement disorder noted   Musculoskeletal - no joint tenderness, deformity or swelling   Extremities - peripheral pulses normal, no pedal edema, no clubbing or cyanosis   Skin - normal coloration and turgor, no rashes, no suspicious skin lesions noted ,      LABORATORY DATA:     Lab Results   Component Value Date    WBC 4.7 01/28/2023    HGB 13.8 01/28/2023    HCT 41.8 01/28/2023    MCV 91.5 01/28/2023     01/28/2023    LYMPHOPCT 21 (L) 01/28/2023    RBC 4.57 01/28/2023    MCH 30.2 01/28/2023    MCHC 33.0 01/28/2023    RDW 15.0 01/28/2023    MONOPCT 8 01/28/2023    BASOPCT 1 01/28/2023    NEUTROABS 3.10 01/28/2023    LYMPHSABS 1.00 01/28/2023    MONOSABS 0.40 01/28/2023    EOSABS 0.10 01/28/2023    BASOSABS 0.00 01/28/2023         Chemistry        Component Value Date/Time     01/28/2023 1500    K 3.8 01/28/2023 1500     01/28/2023 1500    CO2 24 01/28/2023 1500    BUN 19 01/28/2023 1500    CREATININE 0.93 01/28/2023 1500        Component Value Date/Time    CALCIUM 8.8 01/28/2023 1500    ALKPHOS 59 01/28/2023 1500    AST 18 01/28/2023 1500    ALT 19 01/28/2023 1500    BILITOT 0.2 (L) 01/28/2023 1500            PATHOLOGY DATA:   2/9/23  Final Pathologic Diagnosis   1. Left tonsil, biopsy:     Squamous cell carcinoma, HPV associated. Comment: Immunostains were performed to help evaluate the carcinoma and P40 and P16 are diffuse strong positive, supportive of HPV-associated squamous cell carcinoma. All controls are adequate. 2.  Left vallecula, biopsy:   Benign squamous mucosa with focal chronic inflammation and reactive changes.         IMAGING DATA:      CT of the neck done on January 27, 2023    60 mm cystic left neck level 2 mass, presumed metastatic squamous cell   carcinoma.  Congenital second branchial cleft cyst is in the differential   diagnosis only if the lesion has been present for years.       Mild asymmetric enlargement of the left palatine tonsil without measurable   primary mass.           PET/CT on February 8, 2023    1.  Asymmetric radiotracer metabolically active focus in the left tonsillar   area attenuating the left vallecula, consistent with neoplasia.       2.  Cystic left neck mass is noted, photopenic centrally without FDG uptake.   Walls are nodular and demonstrate areas of mild to moderate FDG uptake.   Findings likely related to a necrotic metabolically active lymph node.   Location adjacent to tonsillar metabolic lesion suspicious for metastasis.   No other areas of metabolic activity noted within the cervical lymph nodes.       3.  Congenital absence of right kidney.  Small calcification in the right   adrenal gland without metabolic activity.       ASSESSMENT:       Diagnosis Orders   1. 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 Cervical lymphadenopathy  Stephen Mckenna MD, Radiation Oncology, [place]      3. HPV in male                 PLAN:     I reviewed labs, imaging studies, related electronic medical records including outside records and discussed the diagnosis, prognosis and treatment recommendations   I reviewed the surgical pathology results, discuss goals of care and NCCN guidelines with patient and family    Stage II(T2 N2 MX) left tonsillar cancer with mets to the left cervical area, HPV positive, discussed with the patient the natural history of tonsillar cancer in general and in his case in particular where he can have curative intent approach also  discussed the case with the ENT,  Treatment options has been discussed with the patient including either surgical resection/cervical lymph node dissection and adjuvant systemic treatment versus concurrent chemo RT with cisplatin however patient concerned about nephrotoxicity associated with cisplatin having one kidney will get nephrology evaluation   Case will be discussed at the tumor board tomorrow  Referral for radiation oncology and possible feeding tube should decision to proceed with concurrent chemo RT  Patient will be evaluated by head and neck surgical oncology and I will see him in 2 weeks       I spent a total of 40 minutes on the date of the service which included preparing to see the patient, face-to-face patient care, completing clinical documentation, obtaining and/or reviewing separately obtained history, performing a medically appropriate examination, counseling and educating the patient/family/caregiver and ordering medications, tests, or procedures. [facility]                            This note is created with the assistance of a speech recognition program.  While intending to generate a document that actually reflects the content of the visit, the document can still have some errors including those of syntax and sound a like substitutions which may escape proof reading. It such instances, actual meaning can be extrapolated by contextual diversion.

## 2023-02-16 NOTE — TELEPHONE ENCOUNTER
I called and spoke with Ilana Lopez at Dr. Sánchez Cosme's office. She states she is currently working on this patient and will be calling him to schedule this afternoon and will get him in quickly.

## 2023-02-16 NOTE — TELEPHONE ENCOUNTER
Name: Abad Aguilar  : 1970  MRN: [id number]    Oncology Navigation Follow-Up Note    Contact Type:  Telephone    Notes: Upon review of chart noted Dr. Mikaela Berry placed referral to [facility]/Proton therapy. Spoke with Wilmer SANDHU proton navigator, ([phone]) updated on pt & referral.  Nidhi requested referral faxed directly to Nidhi @ [phone]. Molly Castro, [facility] , updated on Nidhi's request.  Pt notified writer spoke with Nidhi & Nidhi will contact once referral received & insurance verified. Pt verbalized understanding & denied questions/concerns. Instructed pt may contact writer prn. Will continue to follow.     Electronically signed by Suly Higginbotham RN on 2023 at 3:30 PM

## 2023-02-16 NOTE — ACP (ADVANCE CARE PLANNING)
Advance Care Planning     Hospice Services: Patient is not currently receiving hospice services/has not received hospice care within the performance year. Advance Care Planning was discussed with patient, and the patient's Advance Care Plan is as follows: states has medical POA on file here. Unable to locate and will request pt bring copy in.

## 2023-02-17 NOTE — CONSULTS
[address]            Radiation Oncology          [address] [facility]          [address]        [employer] C[employer]: [phone]        F: [phone]       [employer]             2023    Patient: Chandler Alvarez   YOB: 1970       Dear Dr Ricardo Johnson: Thank you for referring Chandler Alvarez to me for evaluation. Below are the relevant portions of my assessment and plan of care. If you have questions, please do not hesitate to call me. I look forward to following this patient along with you. Sincerely,    Electronically signed by Shad Blue MD on 2023 at 2:39 PM    CC: Patient Care Team:  Jerilyn Hernandes MD as PCP - General (Family Medicine)  Carlos Mack RN as Nurse Navigator (Oncology)  Roney Rivera RD, LD as Dietitian (Dietitian Registered)  ------------------------------------------------------------------------------------------------------------------------------------------------------------------------------------------      RADIATION ONCOLOGY NEW PATIENT VISIT:    Date of Service: 2023    Location:  [address] [place] [address],   [address] [place] [address]., Jesus Campos   [phone]     Patient ID:   Chandler Alvarez  : 1970   MRN: [id number]    CHIEF COMPLAINT: \"Tonsil cancer\"    DIAGNOSIS:  Cancer Staging  Squamous cell carcinoma of left tonsil ([facility] [address]  Staging form: Pharynx - HPV-Mediated Oropharynx, AJCC 8th Edition  - Clinical stage from 2023: Stage II (cT1, cN2, cM0, p16+) - Signed by Shad Blue MD on 2023      HISTORY OF PRESENT ILLNESS:   Chandler Alvarez is a 46 y.o. male with a painful left neck mass that developed over the past month for which she initially presented to the ED. Patient had a CT of the neck on 2023 which noted a 6 cm cystic mass on the left side of the neck representing likely lymph node. There was also mild asymmetry of the left palatine tonsil. Patient has no history of smoking. He was referred to medical oncology and underwent a PET scan on February 8, 2023 which confirmed uptake in the left tonsil though the left cystic mass was photopenic likely due to necrosis within the lymph node. Of note patient was also noted to have a congenital absence of the right kidney. Patient was referred to ENT and underwent a biopsy of the left tonsil on 2/9/2023 at [address] [place] [address] by Dr. Emily Mcguire. Pathology back positive for a squamous cell carcinoma p16 positive. Patient has been referred to ENT surgery at [facility] in [facility] for discussion of surgical excision. He denies any issues at this time of headaches, dizziness, trouble swallowing, chest pain, shortness of breath, abdominal pain, nausea, diarrhea, bony pain, weight loss, fatigue, or bleeding. He does follow with a dentist and was last seen a few months ago. He is concerned about treatment side effects and how it may impact his kidney. Patient is interested in potential proton therapy treatment at [facility] in [place]. PRIOR RADIATION HISTORY:  No prior history of radiation therapy    PACEMAKER:   None    PAST MEDICAL HISTORY:  Past Medical History:   Diagnosis Date    Head and neck cancer ([facility] [address]     Herpes     Solitary kidney, congenital        PAST SURGICAL HISTORY:  Past Surgical History:   Procedure Laterality Date    ACHILLES TENDON SURGERY Right        MEDICATIONS:    Current Outpatient Medications:     acetaminophen (TYLENOL) 325 MG tablet, Take 650 mg by mouth every 6 hours as needed for Pain, Disp: , Rfl:     doxycycline (VIBRAMYCIN) 50 MG capsule, Take 50 mg by mouth 2 times daily (Patient not taking: Reported on 2/15/2023), Disp: , Rfl:     tamsulosin (FLOMAX) 0.4 MG capsule, Take 2 capsules by mouth daily.  (Patient not taking: Reported on 2/3/2023), Disp: 60 capsule, Rfl: 5    ALLERGIES:   No Known Allergies    SOCIAL HISTORY:  Social History     Socioeconomic History    Marital status:    Tobacco Use    Smoking status: Never    Smokeless tobacco: Never   Substance and Sexual Activity    Alcohol use: Yes     Comment: 2-3 BEERS DAILY    Drug use: Never    Sexual activity: Yes     Partners: Female       FAMILY HISTORY:  Family History   Problem Relation Age of Onset    Cancer Father        REVIEW OF SYSTEMS:    GENERAL/CONSTITUTIONAL: The patient denies fever, fatigue, weakness, weight gain or weight loss. HEENT: (+) L Neck mass. The patient denies pain, redness, loss of vision, double or blurred vision. The patient denies ringing in the ears, loss of hearing, hoarseness, trouble swallowing, or painful swallowing. CARDIOVASCULAR: The patient denies chest pain, irregular heartbeats, sudden changes in heartbeat or palpitation. RESPIRATORY: The patient denies shortness of breath, cough, hemoptysis. GASTROINTESTINAL: The patient denies nausea, vomiting, diarrhea, constipation, blood in the stools. GENITOURINARY: The patient denies difficult urination, pain or burning with urination, blood in the urine. MUSCULOSKELETAL: The patient denies arm, buttock, thigh or calf cramps. No joint or muscle pain. SKIN: The patient denies easy bruising, skin redness, skin rash, hives. NEUROLOGIC: The patient denies headache, dizziness, fainting, muscle spasm, loss of consciousness. ENDOCRINE: The patient denies intolerance to hot or cold temperature, flushing. HEMATOLOGIC/LYMPHATIC: The patient denies anemia, bleeding tendency or clotting tendency. ALLERGIC/IMMUNOLOGIC: The patient denies rhinitis, asthma, skin sensitivity. PYSCHOLOGIC: The patient denies any depression, anxiety, or confusion. A full 14 point review of systems was performed and assessed and found to be negative except as noted above.         PHYSICAL EXAMINATION:    CHAPERONE: Family/friend/companieon Present    ECO Asymptomatic    VITAL SIGNS: /77   Pulse 59   Temp 97.9 °F (36.6 °C) (Oral)   Resp 16   Wt 186 lb 12.8 oz (84.7 kg)   BMI 26.80 kg/m²   GENERAL:  General appearance is that of a well-nourished, well-developed in no apparent distress. HEENT: Normocephalic, atraumatic, EOMI, moist mucosa, no erythema.   (+) L tonsil asymmetry. NECK:  (+) L neck adenopathy, no palpable thyroid mass, trachea is midline. LYMPHATICS: (+) L cervical adenopathy, no supraclavicular adenopathy. HEART:  Normal rate and regular rhythm, normal heart sounds. LUNGS:  Pulmonary effort normal. Normal breath sounds. ABDOMEN:  Soft, nontender, non distended. EXTREMITIES:  No edema. No calf tenderness. MSK:  No spinal tenderness. Normal ROM. NEUROLOGICAL: Alert and oriented. Strength and sensation intact bilaterally. No focal deficits. PSYCH: Mood normal, behavior normal.  SKIN: No erythema, no desquamation. LABS:  WBC   Date Value Ref Range Status   2023 4.7 3.5 - 11.0 k/uL Final     Segs Absolute   Date Value Ref Range Status   2023 3.10 1.8 - 7.7 k/uL Final     Hemoglobin   Date Value Ref Range Status   2023 13.8 13.5 - 17.5 g/dL Final     Platelets   Date Value Ref Range Status   2023 214 140 - 450 k/uL Final     No results found for: , CEA  No results found for:   PSA   Date Value Ref Range Status   04/10/2013 0.35 ng/mL Final         IMAGIN/8/23 PET Scan  Impression   1. Asymmetric radiotracer metabolically active focus in the left tonsillar   area attenuating the left vallecula, consistent with neoplasia. 2.  Cystic left neck mass is noted, photopenic centrally without FDG uptake. Walls are nodular and demonstrate areas of mild to moderate FDG uptake. Findings likely related to a necrotic metabolically active lymph node. Location adjacent to tonsillar metabolic lesion suspicious for metastasis. No other areas of metabolic activity noted within the cervical lymph nodes. 3.  Congenital absence of right kidney.   Small calcification in the right   adrenal gland without metabolic activity. 1/28/23 CT Neck  Impression   60 mm cystic left neck level 2 mass, presumed metastatic squamous cell   carcinoma. Congenital second branchial cleft cyst is in the differential   diagnosis only if the lesion has been present for years. Mild asymmetric enlargement of the left palatine tonsil without measurable   primary mass. PATHOLOGY:  2/9/23 Biopsy (Results were reviewed in [address])  Final Pathologic Diagnosis   1. Left tonsil, biopsy:     Squamous cell carcinoma, HPV associated. Comment: Immunostains were performed to help evaluate the carcinoma and P40 and P16 are diffuse strong positive, supportive of HPV-associated squamous cell carcinoma. All controls are adequate. 2.  Left vallecula, biopsy:   Benign squamous mucosa with focal chronic inflammation and reactive changes. ASSESSMENT AND PLAN:   Aristeo Whitley is a 46 y.o. male with a Cancer Staging  Squamous cell carcinoma of left tonsil ([facility] [address]  Staging form: Pharynx - HPV-Mediated Oropharynx, AJCC 8th Edition  - Clinical stage from 2/9/2023: Stage II (cT1, cN2, cM0, p16+) - Signed by Eugenia Carrasquillo MD on 2/17/2023       We reviewed with the patient the testing that has been done so far, including imaging and pathology results. We also reviewed their staging based on the testing that as been done and the recommendations per the NCCN guidelines. We discussed the options of treatment, including surgery, chemotherapy, and radiation, and the rationale of why radiation therapy would be indicated for this diagnosis. We also discussed that they have the option to not pursue our recommended treatment as well. We reviewed with the patient that upfront surgical resection is potentially an option for which she will be seeing ENT. This may help reduce the dose of radiation and potentially avoid the need for adjuvant chemotherapy.   However given the large node size, patient will need adjuvant radiation therapy. We did also discuss the option of doing definitive treatment with concurrent chemotherapy and radiation, likely with chemotherapy being in the form of carboplatin due to his congenital kidney. Patient was reviewed the need for dental clearance, as well as potentially need for PEG tube for nutritional support depending on symptoms. We reviewed the logistics of radiation treatment planning, including a CT Simulation session, as well as daily treatments for approximately 6-7 weeks. With regards to radiation to the head/neck area, I discussed the possible short-term side effects of skin reaction (causing redness, dryness, or peeling), tiredness, low blood counts (causing infection or bleeding), sore throat, change in taste, hair loss in treated area and dryness of the mouth, dysphagia leading to feeding tube placement which may be permanent. Possible long-term side effects discussed included hyperpigmentation of the skin, increased firmness of the tissues of the neck, permanent dryness of the mouth, permanent change in taste, damage to the nerves (causing numbness, weakness, or paralysis), damage to the brain, damage to the bone (causing necrosis), trismus, hearing loss, decreased thyroid function, and scarring of the lung (causing shortness of breath/cough). After ample time to review the patient's questions, we will follow-up at [facility] and [facility] for surgical opinions. Patient would like a referral to [facility] for proton beam treatment discussion. Patient will follow-up after surgery to review his final pathology. If he does not have surgery patient is notified to contact us to begin treatment planning. Patient was in agreement with my recommendations. All questions were answered to their satisfaction. Patient was advised to contact us anytime should they have any questions or concerns.          Electronically signed by Nicolle Heck MD on 2/16/2023 at 2:39 PM      Drugs Prescribed:  New Prescriptions    No medications on file       Orders Placed:     Orders Placed This Encounter   Procedures    External Referral To ENT    External Referral To Radiation Oncology         CC:  Patient Care Team:  Noe Adler MD as PCP - General (Family Medicine)  Fatmata Page RN as Nurse Navigator (Oncology)  Latoya Aguilar RD, LD as Dietitian (Dietitian Registered)         Total time spent on this case on the day of encounter is 60 minutes. This time includes combination of one or more of the following - review of necessary tests, review of pertinent medical records from the EMR, performing medically appropriate examination and evaluation, counseling and educating the patient/family/caregiver, ordering necessary medical tests, procedures etc., documenting the clinical information in the electronic medical record, care coordination, referring and communicating with other health care providers and interpretation of results independently. This note is created with the assistance of a speech recognition program.  While intending to generate a document that actually reflects the content of the visit, the document can still have some errors including those of syntax and sound a like substitutions which may escape proof reading. It such instances, actual meaning can be extrapolated by contextual diversion.

## 2023-02-20 ENCOUNTER — TELEPHONE (OUTPATIENT)
Dept: ONCOLOGY | Age: 53
End: 2023-02-20

## 2023-02-20 NOTE — TELEPHONE ENCOUNTER
Name: Elie Rojas  : 1970  MRN: [id number]    Oncology Navigation Follow-Up Note    Contact Type:  Telephone    Notes: Spoke with pt for f/u call to inquire on [facility] referral.  Pt stated spoke with Caden Terrell [address] [place] [address] navigator, today & await insurance approval to schedule consultation. Pt stated awaits call from nephrology office. Pt denied questions/concerns. Instructed  may contact writer prn. Will continue to follow.     Electronically signed by Christiano Marsh RN on 2023 at 3:16 PM

## 2023-02-21 ENCOUNTER — TELEPHONE (OUTPATIENT)
Dept: ONCOLOGY | Age: 53
End: 2023-02-21

## 2023-02-21 NOTE — TELEPHONE ENCOUNTER
Following up on Nephrology referral. I called and spoke with Rokcy Causey in the Nephrologist office and she states she left messages for patient to call to set up appointments on 2/16/23 and 2/17/23. She asked to have patient call. I then called Branden Valadez. Branden Valadez states he did get the messages and called back. I confirmed that Branden Valadez had the correct number and he will call again. He thank me for the help.

## 2023-02-21 NOTE — TELEPHONE ENCOUNTER
Navigator received message from patient that the soonest he could get in a nephrologist is 3/30/23. He did not schedule. Navigator asked me to call office to see if we could get him in sooner. I called and spoke with Edson who confirmed this to be correct. Edson informed me that she offered to schedule patient for 3/30/23 and put him on the cancellation list, as that is their protocol. Edson states she explained this to patient but he declined to schedule. I updated navigator. Navigator advised me to call patient's HIPAA/girlfriend Heaven Maradiaga to update her. I called and left Heaven Maradiaga a message to call me back so I can explain. [facility], RN Navigator updated.

## 2023-02-22 ENCOUNTER — TELEPHONE (OUTPATIENT)
Dept: ONCOLOGY | Age: 53
End: 2023-02-22

## 2023-02-22 NOTE — TELEPHONE ENCOUNTER
I spoke with patient and explained that it is advised for him to schedule the appointment with nephrology so they can get him on the cancellation list. Patient asked about the referral as to reason for it has he was born with the one kidney and never had any issues with it. I confirmed with our navigator who was a Rad Onc RN, referral is precautionary prior to treatments. Patient informed of this and will call nephrology to schedule.

## 2023-02-22 NOTE — TELEPHONE ENCOUNTER
Name: Derrick Candelaira  : 1970  MRN: [id number]    Oncology Navigation Follow-Up Note    Contact Type:  Telephone    Notes: Received VM from Tu Terrell navigator, stating pt declined to schedule consult @ this time r/t possible surgery first.  Nidhi stated if pt wants to proceed pt's insurance requires PA & comparison plan. Upon review of chart noted pt completed Dr. Arpit Frances, [facility], consult yesterday. Noted consult note unavailable & pt scheduled for [facility] RO consult . Will continue to follow.     Electronically signed by Eugenie Azeevdo RN on 2023 at 7:59 AM

## 2023-02-23 PROBLEM — E78.2 MIXED HYPERLIPIDEMIA: Status: ACTIVE | Noted: 2017-05-23

## 2023-02-23 PROBLEM — R74.8 ELEVATED LIVER ENZYMES: Status: ACTIVE | Noted: 2017-05-23

## 2023-02-24 PROBLEM — N18.1 CKD (CHRONIC KIDNEY DISEASE), STAGE I: Status: ACTIVE | Noted: 2023-02-24

## 2023-02-24 PROBLEM — Z90.5 SINGLE KIDNEY: Status: ACTIVE | Noted: 2023-02-24

## 2023-02-27 ENCOUNTER — TELEPHONE (OUTPATIENT)
Dept: ONCOLOGY | Age: 53
End: 2023-02-27

## 2023-02-27 NOTE — TELEPHONE ENCOUNTER
Name: Margi Merritt  : 1970  MRN: [id number]    Oncology Navigation Follow-Up Note    Contact Type:  Telephone    Notes: Pt called in stating scheduled for surgery @ [facility] 3/6. Pt stated canceled  Dr. Rhea Schmidt consult. Per pt if adjuvant tx recommended would like to complete locally. Notified pt writer will follow closely & coordinate dual MO/RO f/u's after final pathology available. Pt verbalized understanding, agreeable, & denied questions/concerns. Instructed pt may contact writer prn. Will continue to follow.     Electronically signed by Jean Pierre Lamar RN on 2023 at 1:06 PM

## 2023-03-10 ENCOUNTER — TELEPHONE (OUTPATIENT)
Dept: ONCOLOGY | Age: 53
End: 2023-03-10

## 2023-03-10 NOTE — TELEPHONE ENCOUNTER
Name: Estrella Castaneda  : 1970  MRN: [id number]    Oncology Navigation Follow-Up Note    Contact Type:  Telephone    Notes: BODØ, pt's S.O., called in stating scheduled Dr. Vegas Mom Dr. Oswaldo Leary f/u 3/22. BODØ stated await call from Dr. Gilma Parra office to schedule post op visit. BODØ stated pt's 3/6 [facility] surgical pathology available via [facility] My Chart & inquired on results. Betty Cox will review results @ post op visit. BODØ denied further questions/concerns. Instructed may contact writer prn. Will continue to follow.     Electronically signed by Georgina Zarate RN on 3/10/2023 at 3:29 PM

## 2023-03-22 ENCOUNTER — TELEPHONE (OUTPATIENT)
Dept: ONCOLOGY | Age: 53
End: 2023-03-22

## 2023-03-22 ENCOUNTER — TELEPHONE (OUTPATIENT)
Dept: RADIATION ONCOLOGY | Age: 53
End: 2023-03-22

## 2023-03-22 ENCOUNTER — HOSPITAL ENCOUNTER (OUTPATIENT)
Dept: RADIATION ONCOLOGY | Age: 53
Discharge: HOME | End: 2023-03-22
Payer: COMMERCIAL

## 2023-03-22 ENCOUNTER — OFFICE VISIT (OUTPATIENT)
Dept: ONCOLOGY | Age: 53
End: 2023-03-22
Payer: COMMERCIAL

## 2023-03-22 VITALS
TEMPERATURE: 97.4 F | WEIGHT: 178 LBS | BODY MASS INDEX: 27.88 KG/M2 | HEART RATE: 71 BPM | SYSTOLIC BLOOD PRESSURE: 124 MMHG | RESPIRATION RATE: 14 BRPM | DIASTOLIC BLOOD PRESSURE: 81 MMHG

## 2023-03-22 VITALS
WEIGHT: 178 LBS | BODY MASS INDEX: 27.88 KG/M2 | HEART RATE: 71 BPM | SYSTOLIC BLOOD PRESSURE: 124 MMHG | RESPIRATION RATE: 14 BRPM | DIASTOLIC BLOOD PRESSURE: 81 MMHG | TEMPERATURE: 97.4 F

## 2023-03-22 DIAGNOSIS — C09.9 SQUAMOUS CELL CARCINOMA OF LEFT TONSIL (HCC): Primary | ICD-10-CM

## 2023-03-22 PROCEDURE — 99212 OFFICE O/P EST SF 10 MIN: CPT | Performed by: RADIOLOGY

## 2023-03-22 PROCEDURE — 99215 OFFICE O/P EST HI 40 MIN: CPT | Performed by: INTERNAL MEDICINE

## 2023-03-22 PROCEDURE — 99211 OFF/OP EST MAY X REQ PHY/QHP: CPT | Performed by: INTERNAL MEDICINE

## 2023-03-22 NOTE — PROGRESS NOTES
Patient ID: Cordelia Rubi, 1970, [id number], 46 y.o. Referred by :  No ref. provider found   Reason for consultation: Left cervical lymph node      HISTORY OF PRESENT ILLNESS:    Oncologic History:    Cordelia Rubi is a very pleasant 46 y.o. male. 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 Congenital second branchial cleft cyst is in the differential   diagnosis only if the lesion has been present for years. Mild asymmetric enlargement of the left palatine tonsil without measurable   primary mass. PET/CT on February 8, 2023    1. Asymmetric radiotracer metabolically active focus in the left tonsillar   area attenuating the left vallecula, consistent with neoplasia. 2.  Cystic left neck mass is noted, photopenic centrally without FDG uptake. Walls are nodular and demonstrate areas of mild to moderate FDG uptake. Findings likely related to a necrotic metabolically active lymph node. Location adjacent to tonsillar metabolic lesion suspicious for metastasis. No other areas of metabolic activity noted within the cervical lymph nodes. 3.  Congenital absence of right kidney. Small calcification in the right   adrenal gland without metabolic activity. Patient underwent direct visualization by ENT 2/9/23       OPERATION PERFORMED:  1. Direct laryngoscopy with biopsies. 2. Biopsy of left oropharynx. SURGEON: Trina Avalos MD.    ANESTHESIA: General endotracheal.    ESTIMATED BLOOD LOSS: Minimal.    OPERATIVE FINDINGS: He was noted to have a 6 cm mass in the left  upper cervical neck, consistent with a metastatic lymph node.  He  was

## 2023-03-22 NOTE — TELEPHONE ENCOUNTER
Patient requires dental clearance prior to initiation of radiation therapy. He sees the [facility] in [facility] ([phone]). Patient states he has an appointment there tomorrow. Request for Dental Clearance Letter faxed to [employer] at [phone]- fax number verified with office as fax number on website is incorrect. Await return of clearance letter to schedule treatment simulation.

## 2023-03-22 NOTE — TELEPHONE ENCOUNTER
Allen GREENBERG, RN Navigator. Dr. Alfonso Odonnell wants to speak with Dr. Lacy Ann of  ENT. I called [facility] and spoke with Kailash Marshall with [facility] and she sent message to Dr. Vicky Jack to call Dr. Du You cell phone(number given to Kailash Marshall). Ynes macias.

## 2023-03-22 NOTE — TELEPHONE ENCOUNTER
AVS from 3/22/23    RTC in 2 weeks       *rv is sched for [email or web address]      PT was given AVS and appt schedule

## 2023-03-22 NOTE — TELEPHONE ENCOUNTER
Name: Dewey Bai  : 1970  MRN: [id number]    Oncology Navigation Follow-Up Note    Contact Type:  Medical Oncology    Notes: Pt @ [facility] for dual MO/RO appts. Met with pt after Dr. Arely Lema f/u completed. Pt denied questions/concerns. Encouraged to contact writer prn. Will continue to follow.     Electronically signed by Nellene Klinefelter, RN on 3/22/2023 at 11:43 AM

## 2023-03-23 ENCOUNTER — TELEPHONE (OUTPATIENT)
Dept: ONCOLOGY | Age: 53
End: 2023-03-23

## 2023-03-23 NOTE — TELEPHONE ENCOUNTER
Name: Mouna Boo  : 1970  MRN: [id number]    Oncology Navigation Follow-Up Note    Contact Type:  Telephone    Notes: Pt called in stating completed dental appt & received clearance. Instructed pt writer will update Camacho Read [facility] RO nurse. Camacho Read updated on pt. Will continue to follow.     Electronically signed by Nancy Guerrero RN on 3/23/2023 at 2:29 PM

## 2023-03-24 NOTE — PROGRESS NOTES
Michael Hernández. Patient provided with fully executed copy of informed consent form. Patient will need dental letter from Dentist prior to treatent simulation. Patient will call office once dental clearance obtained to schedule radiation teaching and treatment simulation.         Inge Reyna RN
NCCN guidelines. We discussed the options of treatment, including surgery, chemotherapy, and radiation, and the rationale of why radiation therapy would be indicated for this diagnosis. We also discussed that they have the option to not pursue our recommended treatment as well. Given his 2 lymph node involvement and the size of the largest deposit being 6.0cm, he has adverse features and will be need adjuvant radiation. Patient is seeing medical oncology to discuss the need for adjuvant concurrent chemotherapy. We reviewed the logistics of radiation treatment planning, including a CT Simulation session, as well as daily treatments for approximately 7 weeks. With regards to radiation to the head/neck area, I discussed the possible short-term side effects of skin reaction (causing redness, dryness, or peeling), tiredness, low blood counts (causing infection or bleeding), sore throat, change in taste, hair loss in treated area and dryness of the mouth, dysphagia leading to feeding tube placement which may be permanent. Possible long-term side effects discussed included hyperpigmentation of the skin, increased firmness of the tissues of the neck, permanent dryness of the mouth, permanent change in taste, damage to the nerves (causing numbness, weakness, or paralysis), damage to the brain, damage to the bone (causing necrosis), trismus, hearing loss, decreased thyroid function, and scarring of the lung (causing shortness of breath/cough). After ample time to review the patient's questions, an informed consent was obtained to proceed with scheduling a treatment planning session for radiation therapy. Patient will be referred for speech, lymphedema, and physical therapy. He was discussed the option of a PEG, which he would like to avoid, therefore we will follow closely. Patient was in agreement with my recommendations. All questions were answered to their satisfaction.  Patient was advised to contact us

## 2023-03-27 ENCOUNTER — TELEPHONE (OUTPATIENT)
Dept: RADIATION ONCOLOGY | Age: 53
End: 2023-03-27

## 2023-03-27 DIAGNOSIS — C09.9 SQUAMOUS CELL CARCINOMA OF LEFT TONSIL (HCC): Primary | ICD-10-CM

## 2023-03-30 ENCOUNTER — HOSPITAL ENCOUNTER (OUTPATIENT)
Dept: OCCUPATIONAL THERAPY | Age: 53
Setting detail: THERAPIES SERIES
Discharge: HOME | End: 2023-03-30
Payer: COMMERCIAL

## 2023-03-30 PROCEDURE — 97535 SELF CARE MNGMENT TRAINING: CPT

## 2023-03-30 PROCEDURE — 97166 OT EVAL MOD COMPLEX 45 MIN: CPT

## 2023-03-30 NOTE — CONSULTS
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 ( Certification Dates: 3/30/2023 - 23)        Allergies:  No Known Allergies  Medications: See charted information in Epic  Past Medical History: See charted information in Epic     Restrictions: None  Fall Risk:   [x] No    [] Yes   If yes, intervention:    Suarez Fall Scale: n/a      Overview: Patient is a 46year old male referred to the Lymphedema Clinic with a diagnosis of cancer of L tonsil. Pt is at risk for developing lymphedema 2/2 CA treatment that has included L neck lymph node dissection and pending radiation, further damaging lymphatics in the neck putting head/neck at risk for chronic swelling. Pt would benefit from education regarding lymphedema prevention and treatment. SUBJECTIVE:  Pt reports that he is doing well after surgery, able to eat, but with time and more difficulty. States he might feel some fullness in submental region.      Hx of Complete Decongestive Therapy:[]Yes - Date:        [x]No   Swallowing deficits?:  [x] Yes   [] No   Comments: very mild, can still eat all foods, but has to think more and use more effort to swallow and use his tongue - has SLP referral, but nothing scheduled   Communication deficits?:  [] Yes   [x] No   Comments:

## 2023-03-31 ENCOUNTER — HOSPITAL ENCOUNTER (OUTPATIENT)
Age: 53
End: 2023-03-31
Payer: COMMERCIAL

## 2023-03-31 ENCOUNTER — HOSPITAL ENCOUNTER (OUTPATIENT)
Dept: RADIATION ONCOLOGY | Age: 53
Discharge: HOME | End: 2023-03-31
Payer: COMMERCIAL

## 2023-03-31 ENCOUNTER — TELEPHONE (OUTPATIENT)
Dept: RADIATION ONCOLOGY | Age: 53
End: 2023-03-31

## 2023-03-31 DIAGNOSIS — C09.9 SQUAMOUS CELL CARCINOMA OF LEFT TONSIL (HCC): ICD-10-CM

## 2023-03-31 DIAGNOSIS — C09.9 SQUAMOUS CELL CARCINOMA OF LEFT TONSIL (HCC): Primary | ICD-10-CM

## 2023-03-31 PROCEDURE — 77334 RADIATION TREATMENT AID(S): CPT | Performed by: RADIOLOGY

## 2023-03-31 NOTE — PROGRESS NOTES
Pt here today for teach and simulation scan. Radiation and You information provided along with additional education regarding radiation therapy and what to expect. Pt verbalizes understanding and all questions answered to the best of my knowledge. Samples of aquaphor and community resource information provided. Pt escorted to CT room without any difficulty. Reviewed side effects of left tonsil radiation treatments with patient. Patient will RT only and no chemo and plan for 30 fractions of radiation. Discussed pt's history of one kidney (congenital) with Dr. Rona Smith and so therefore did not give IV contrast for simulation today.

## 2023-03-31 NOTE — PRE-CERTIFICATION NOTE
[] [address]  [address]  [phone]  F: [phone] [] [address]  [facility]   [address]  P: [phone]  F: [phone] [] [place]  [address] [place] [address]  P: [phone]  F: [phone] [] [address]  [facility]   [address]   [place]: [phone]  F: [phone]  [x] Ronald Pantoja   Outpatient Occupational Therapy  [address]: [phone]  F: [phone]          Therapy Pre-certification Note      3/31/2023    Sharita Roque  1970   [id number]      Insurance approval was received for Occupational Therapy from [facility] on 3/30/2023. Approval was received for 12 visits, from 3/30/23 to 5/29/23. Authorization number [id number] (Therapy-OT). Primary therapist was notified.       Electronically signed by Chantale Michel PT on 3/31/2023 at 6:56 AM

## 2023-03-31 NOTE — DISCHARGE INSTRUCTIONS
they are:  Radiation therapy can cause skin changes in your treatment area. Here are some common skin changes:  Redness. Your skin in the treatment area may look as if you have a mild to severe sunburn or tan  Severe itching. The skin in your treatment area may itch very badly. It is important to avoid scratching, which can cause skin breakdown and infection. Skin breakdown is a problem that happens when the skin in the treatment area peels off faster than it can grow back. Dry and peeling skin. The skin in your treatment area can get very dry. It may get so dry that it starts to peel, as if you have had a bad sunburn. If it peels off faster than it can grow back, you may develop sores or ulcers. Moist reaction. The skin in your treatment area can become wet, sore and infected This problem is more common where you have skin folds, such as your buttocks, behind your ears and under your breasts. It may also occur where your skin is very thin, such as your neck. Swollen skin. The skin in your treatment area may be swollen and puffy. Ways to Manage Skin Changes    Skin Care. Take extra good care of your skin during radiation therapy. Be gentle and do not rub, scrub, or scratch in the treatment area. Use creams that your doctor or nurse suggests. Ag / Ryann Sweeney / Rafael   Apply recommended lotion to treatment area 2 times a day. This should start the day you start radiation treatments. Do not put anything on your skin that is very hot or cold. Do not use heating pads, ice packs or other hot or cold items on the treatment area  Be gentle when you shower or take a bath. You can take a lukewarm shower every day. If you prefer to take a lukewarm bath, so do only every other day and don't soak  For too long. Whether you take a shower or bath, make sure to use a mild soap. Dry yourself with a soft towel by patting, not rubbing, your skin.  Be careful not to wash off the ink markings that you need for radiation

## 2023-03-31 NOTE — TELEPHONE ENCOUNTER
Requested documentation in form of progress note or letter that patient was seen and evaluated by dentist [facility] in [place]. Office states this is in process and left message requesting this be faxed to our office upon completion.

## 2023-04-05 ENCOUNTER — TELEPHONE (OUTPATIENT)
Dept: ONCOLOGY | Age: 53
End: 2023-04-05

## 2023-04-05 ENCOUNTER — OFFICE VISIT (OUTPATIENT)
Dept: ONCOLOGY | Age: 53
End: 2023-04-05
Payer: COMMERCIAL

## 2023-04-05 VITALS
BODY MASS INDEX: 28.66 KG/M2 | DIASTOLIC BLOOD PRESSURE: 71 MMHG | SYSTOLIC BLOOD PRESSURE: 124 MMHG | WEIGHT: 183 LBS | TEMPERATURE: 97.3 F | HEART RATE: 76 BPM

## 2023-04-05 DIAGNOSIS — C09.9 SQUAMOUS CELL CARCINOMA OF LEFT TONSIL (HCC): Primary | ICD-10-CM

## 2023-04-05 PROCEDURE — 99214 OFFICE O/P EST MOD 30 MIN: CPT | Performed by: INTERNAL MEDICINE

## 2023-04-05 PROCEDURE — 99211 OFF/OP EST MAY X REQ PHY/QHP: CPT | Performed by: INTERNAL MEDICINE

## 2023-04-05 NOTE — PROGRESS NOTES
can still have some errors including those of syntax and sound a like substitutions which may escape proof reading. It such instances, actual meaning can be extrapolated by contextual diversion.

## 2023-04-05 NOTE — TELEPHONE ENCOUNTER
AVS from 4/5/23      RTC in 2 weeks    Rv scheduled for 4/19 @ 9:45 am     Pt was given AVS and appointment schedule    Electronically signed by Rayray Asencio on 4/5/2023 at 11:31 AM

## 2023-04-06 ENCOUNTER — APPOINTMENT (OUTPATIENT)
Dept: OCCUPATIONAL THERAPY | Age: 53
End: 2023-04-06
Payer: COMMERCIAL

## 2023-04-06 ENCOUNTER — TELEPHONE (OUTPATIENT)
Dept: ONCOLOGY | Age: 53
End: 2023-04-06

## 2023-04-06 NOTE — TELEPHONE ENCOUNTER
Marvin GREENBERG, RN Navigator. I called Jeny [facility] office to check on 3/21/23 progress note as it appears incomplete in [address]. Spoke to the office who transferred me to Medical Records. Martine Keys in Medical Records states that the note is incomplete and not sure when the provider will complete it, once complete we can obtain it through [address]. Ynes macias.

## 2023-04-07 ENCOUNTER — HOSPITAL ENCOUNTER (OUTPATIENT)
Dept: RADIATION ONCOLOGY | Age: 53
Discharge: HOME | End: 2023-04-07
Payer: COMMERCIAL

## 2023-04-07 PROCEDURE — 77301 RADIOTHERAPY DOSE PLAN IMRT: CPT | Performed by: RADIOLOGY

## 2023-04-07 PROCEDURE — 77338 DESIGN MLC DEVICE FOR IMRT: CPT | Performed by: RADIOLOGY

## 2023-04-07 PROCEDURE — 77300 RADIATION THERAPY DOSE PLAN: CPT | Performed by: RADIOLOGY

## 2023-04-10 PROCEDURE — 77336 RADIATION PHYSICS CONSULT: CPT | Performed by: RADIOLOGY

## 2023-04-12 ENCOUNTER — APPOINTMENT (OUTPATIENT)
Dept: RADIATION ONCOLOGY | Age: 53
End: 2023-04-12
Payer: COMMERCIAL

## 2023-04-13 ENCOUNTER — APPOINTMENT (OUTPATIENT)
Dept: RADIATION ONCOLOGY | Age: 53
End: 2023-04-13
Payer: COMMERCIAL

## 2023-04-14 ENCOUNTER — APPOINTMENT (OUTPATIENT)
Dept: RADIATION ONCOLOGY | Age: 53
End: 2023-04-14
Payer: COMMERCIAL

## 2023-04-17 ENCOUNTER — HOSPITAL ENCOUNTER (OUTPATIENT)
Dept: RADIATION ONCOLOGY | Age: 53
Discharge: HOME | End: 2023-04-17
Payer: COMMERCIAL

## 2023-04-17 ENCOUNTER — APPOINTMENT (OUTPATIENT)
Dept: RADIATION ONCOLOGY | Age: 53
End: 2023-04-17
Payer: COMMERCIAL

## 2023-04-17 PROCEDURE — 77386 HC NTSTY MODUL RAD TX DLVR CPLX: CPT | Performed by: RADIOLOGY

## 2023-04-18 ENCOUNTER — HOSPITAL ENCOUNTER (OUTPATIENT)
Dept: PHYSICAL THERAPY | Facility: CLINIC | Age: 53
Setting detail: THERAPIES SERIES
Discharge: HOME | End: 2023-04-18
Payer: COMMERCIAL

## 2023-04-18 ENCOUNTER — HOSPITAL ENCOUNTER (OUTPATIENT)
Dept: RADIATION ONCOLOGY | Age: 53
Discharge: HOME | End: 2023-04-18
Payer: COMMERCIAL

## 2023-04-18 ENCOUNTER — APPOINTMENT (OUTPATIENT)
Dept: RADIATION ONCOLOGY | Age: 53
End: 2023-04-18
Payer: COMMERCIAL

## 2023-04-18 PROCEDURE — 77386 HC NTSTY MODUL RAD TX DLVR CPLX: CPT | Performed by: RADIOLOGY

## 2023-04-18 PROCEDURE — 97110 THERAPEUTIC EXERCISES: CPT

## 2023-04-18 PROCEDURE — 97140 MANUAL THERAPY 1/> REGIONS: CPT

## 2023-04-18 NOTE — FLOWSHEET NOTE
by for improved mikey to activity and decr UW-QOL score by 5% for overall improved function        2. Maintain cervical soft tissue extensibility to allow for more normal motion and function        3. Manage/mitigate side effects/late effects of Cancer treatment        4. Continue activity to decrease risk factors associated with increased time being sedentary while undergoing chemotherapy, radiation or surgery                  Patient goals: Feel good! Pt. Education:  [x] Yes  [] No  [x] Reviewed Prior HEP/Ed  Method of Education: [x] Verbal  [x] Demo  [x] Written- HEP  Comprehension of Education:  [x] Verbalizes understanding. [] Demonstrates understanding. [] Needs review. [] Demonstrates/verbalizes HEP/Ed previously given. Plan: [x] Continue current frequency toward long and short term goals.     [x] Specific Instructions for subsequent treatments: cont w/ manual, progress as mikey      Time In: 11:00 am            Time Out: 12:01 pm    Electronically signed by:  Mak Herrera, PT, CRT

## 2023-04-19 ENCOUNTER — OFFICE VISIT (OUTPATIENT)
Dept: ONCOLOGY | Age: 53
End: 2023-04-19
Payer: COMMERCIAL

## 2023-04-19 ENCOUNTER — HOSPITAL ENCOUNTER (OUTPATIENT)
Dept: RADIATION ONCOLOGY | Age: 53
Discharge: HOME | End: 2023-04-19
Payer: COMMERCIAL

## 2023-04-19 ENCOUNTER — APPOINTMENT (OUTPATIENT)
Dept: RADIATION ONCOLOGY | Age: 53
End: 2023-04-19
Payer: COMMERCIAL

## 2023-04-19 ENCOUNTER — TELEPHONE (OUTPATIENT)
Dept: ONCOLOGY | Age: 53
End: 2023-04-19

## 2023-04-19 VITALS
TEMPERATURE: 98.1 F | DIASTOLIC BLOOD PRESSURE: 73 MMHG | WEIGHT: 185.6 LBS | HEART RATE: 48 BPM | BODY MASS INDEX: 29.07 KG/M2 | RESPIRATION RATE: 16 BRPM | SYSTOLIC BLOOD PRESSURE: 136 MMHG | OXYGEN SATURATION: 98 %

## 2023-04-19 VITALS
RESPIRATION RATE: 16 BRPM | DIASTOLIC BLOOD PRESSURE: 73 MMHG | TEMPERATURE: 98.1 F | BODY MASS INDEX: 29.29 KG/M2 | HEART RATE: 48 BPM | SYSTOLIC BLOOD PRESSURE: 136 MMHG | WEIGHT: 187 LBS

## 2023-04-19 DIAGNOSIS — N18.1 CKD (CHRONIC KIDNEY DISEASE), STAGE I: ICD-10-CM

## 2023-04-19 DIAGNOSIS — R59.0 CERVICAL LYMPHADENOPATHY: ICD-10-CM

## 2023-04-19 DIAGNOSIS — C76.0 HEAD AND NECK CANCER (HCC): Primary | ICD-10-CM

## 2023-04-19 DIAGNOSIS — C09.9 SQUAMOUS CELL CARCINOMA OF LEFT TONSIL (HCC): ICD-10-CM

## 2023-04-19 PROCEDURE — 77386 HC NTSTY MODUL RAD TX DLVR CPLX: CPT | Performed by: RADIOLOGY

## 2023-04-19 PROCEDURE — 99211 OFF/OP EST MAY X REQ PHY/QHP: CPT | Performed by: INTERNAL MEDICINE

## 2023-04-19 PROCEDURE — 99214 OFFICE O/P EST MOD 30 MIN: CPT | Performed by: INTERNAL MEDICINE

## 2023-04-19 RX ORDER — DOXYCYCLINE HYCLATE 100 MG/1
100 CAPSULE ORAL DAILY
COMMUNITY
Start: 2023-04-03

## 2023-04-19 NOTE — PROGRESS NOTES
Paola Powers  4/19/2023  Wt Readings from Last 3 Encounters:   04/19/23 185 lb 9.6 oz (84.2 kg)   04/19/23 187 lb (84.8 kg)   04/05/23 183 lb (83 kg)     Body mass index is 29.07 kg/m². Treatment Area:left tonsil/RT only    Patient was seen today for weekly visit. Comfort Alteration  Fatigue: None    Mucous Membrane Alteration  Mucositis Due to Radiation: No  Thrush: No  Voice Changes: No    Nutritional Alteration  Anorexia: No   Nausea: No   Vomiting: No     Ventilation Alteration  Cough:No    Skin Alteration   Sensation:intact    Radiation Dermatitis:  Intact [x]     Erythema  []     Discoloration  []     Rash []     Dry desquamation  []     Moist desquamation []       Emotional  Coping: effective      Injury, potential bleeding or infection: Skin care reinforced. Lab Results   Component Value Date    WBC 6.5 03/14/2023     03/14/2023         /73   Pulse (!) 48   Temp 98.1 °F (36.7 °C) (Temporal)   Resp 16   Wt 185 lb 9.6 oz (84.2 kg)   SpO2 98%   BMI 29.07 kg/m²      Pain Assessment: None - Denies Pain            Assessment/Plan: Patient was seen today for weekly visit. He arrives ambulatory with steady gait. Third treatment today. States he feels a \"tightening\" in his throat. Oral mucosa intact. Encouraged to use Betamethasone and Aquaphor and massage skin on side of neck. He voices understanding. Dr. Ngo Co notified of assessment and examined patient. Continue current treatment plan.     Ayad Christie RN

## 2023-04-19 NOTE — TELEPHONE ENCOUNTER
AVS from 4/19/23      RTC in 6 to 8 weeks with repeated scan of the neck    Rv scheduled for 6/7 @ 9:30 am     Ct scheduled for 6/1 @ 11:00 am       Pt was given AVS and appointment schedule    Electronically signed by Enrique Hackett on 4/19/2023 at 10:39 AM

## 2023-04-19 NOTE — PROGRESS NOTES
expressed understanding and was in agreement. [facility]                            This note is created with the assistance of a speech recognition program.  While intending to generate a document that actually reflects the content of the visit, the document can still have some errors including those of syntax and sound a like substitutions which may escape proof reading. It such instances, actual meaning can be extrapolated by contextual diversion.

## 2023-04-19 NOTE — PROGRESS NOTES
[facility] [address]       Radiation Oncology          [address] [facility]          [address]        Miguel Aguilar: [phone]        F: [phone]       [email or web address]             RADIATION ONCOLOGY WEEKLY PROGRESS NOTE  Patient ID:   Xin Garcia  : 1970   MRN: [id number]    Location:  [facility],   [address] [place] [address]., Jesus Campos   [phone]    DIAGNOSIS:  Cancer Staging   Squamous cell carcinoma of left tonsil ([facility] [address]  Staging form: Pharynx - HPV-Mediated Oropharynx, AJCC 8th Edition  - Clinical stage from 2023: Stage II (cT1, cN2, cM0, p16+) - Signed by Margaret Ballard MD on 2023      TREATMENT DETAILS:  Treatment Site: head and neck  Actual Dose: 600 cGy  Total Planned Dose: 6000 cGy  Treatment Technique: IMRT  Fraction Technique: Daily  Therapy imaging monitoring: CBCT daily  Concurrent Chemotherapy: None    SUBJECTIVE:   Patient seen for their weekly on treatment evaluation today. Doing well. Reports tightness along the left neck. Has mild pain with swallowing. Tolerating oral diet. Weight is stable. OBJECTIVE:     ECO Asymptomatic    VITAL SIGNS: /73   Pulse (!) 48   Temp 98.1 °F (36.7 °C) (Temporal)   Resp 16   Wt 185 lb 9.6 oz (84.2 kg)   SpO2 98%   BMI 29.07 kg/m²   Wt Readings from Last 5 Encounters:   23 185 lb 9.6 oz (84.2 kg)   23 187 lb (84.8 kg)   23 183 lb (83 kg)   23 178 lb (80.7 kg)   23 178 lb (80.7 kg)     GENERAL:  General appearance is that of a well-nourished, well-developed in no apparent distress. HEENT: No oral mucositis is noted. No radiation dermatitis is noted. HEART:  Normal rate and regular rhythm  LUNGS:  Pulmonary effort normal.  ABDOMEN:  Soft, nontender, non distended  EXTREMITIES:  No edema. No calf tenderness. MSK:  No spinal tenderness. Normal ROM. NEUROLOGICAL: Alert and oriented. Strength and sensation intact bilaterally.  No

## 2023-04-20 ENCOUNTER — APPOINTMENT (OUTPATIENT)
Dept: RADIATION ONCOLOGY | Age: 53
End: 2023-04-20
Payer: COMMERCIAL

## 2023-04-20 ENCOUNTER — HOSPITAL ENCOUNTER (OUTPATIENT)
Dept: RADIATION ONCOLOGY | Age: 53
Discharge: HOME | End: 2023-04-20
Payer: COMMERCIAL

## 2023-04-20 PROCEDURE — 77386 HC NTSTY MODUL RAD TX DLVR CPLX: CPT | Performed by: RADIOLOGY

## 2023-04-21 ENCOUNTER — HOSPITAL ENCOUNTER (OUTPATIENT)
Dept: RADIATION ONCOLOGY | Age: 53
Discharge: HOME | End: 2023-04-21
Payer: COMMERCIAL

## 2023-04-21 ENCOUNTER — APPOINTMENT (OUTPATIENT)
Dept: RADIATION ONCOLOGY | Age: 53
End: 2023-04-21
Payer: COMMERCIAL

## 2023-04-21 PROCEDURE — 77386 HC NTSTY MODUL RAD TX DLVR CPLX: CPT | Performed by: RADIOLOGY

## 2023-04-24 ENCOUNTER — HOSPITAL ENCOUNTER (OUTPATIENT)
Dept: RADIATION ONCOLOGY | Age: 53
Discharge: HOME | End: 2023-04-24
Payer: COMMERCIAL

## 2023-04-24 ENCOUNTER — APPOINTMENT (OUTPATIENT)
Dept: RADIATION ONCOLOGY | Age: 53
End: 2023-04-24
Payer: COMMERCIAL

## 2023-04-24 PROCEDURE — 77014 CHG CT GUIDANCE RADIATION THERAPY FLDS PLACEMENT: CPT | Performed by: RADIOLOGY

## 2023-04-24 PROCEDURE — 77386 HC NTSTY MODUL RAD TX DLVR CPLX: CPT | Performed by: RADIOLOGY

## 2023-04-25 ENCOUNTER — APPOINTMENT (OUTPATIENT)
Dept: RADIATION ONCOLOGY | Age: 53
End: 2023-04-25
Payer: COMMERCIAL

## 2023-04-25 ENCOUNTER — HOSPITAL ENCOUNTER (OUTPATIENT)
Dept: RADIATION ONCOLOGY | Age: 53
Discharge: HOME | End: 2023-04-25
Payer: COMMERCIAL

## 2023-04-25 ENCOUNTER — HOSPITAL ENCOUNTER (OUTPATIENT)
Dept: PHYSICAL THERAPY | Facility: CLINIC | Age: 53
Setting detail: THERAPIES SERIES
Discharge: HOME | End: 2023-04-25
Payer: COMMERCIAL

## 2023-04-25 PROCEDURE — 97110 THERAPEUTIC EXERCISES: CPT

## 2023-04-25 PROCEDURE — 97140 MANUAL THERAPY 1/> REGIONS: CPT

## 2023-04-25 PROCEDURE — 77014 CHG CT GUIDANCE RADIATION THERAPY FLDS PLACEMENT: CPT | Performed by: RADIOLOGY

## 2023-04-25 PROCEDURE — 77386 HC NTSTY MODUL RAD TX DLVR CPLX: CPT | Performed by: RADIOLOGY

## 2023-04-25 NOTE — FLOWSHEET NOTE
[] [facility]) [facility] CENTER &  Therapy  [address]  [phone]  F: [phone] [] [address]  [facility] [address]   [address]  P: [phone]  F: [phone] [x] [address]  [address]  P: [phone]  F: [phone] [] [address]  P: [phone]  F: [phone] [] [address]  [facility]   [address]   [place]: [phone]  F: [phone]      Physical Therapy Daily Treatment Note    Date:  2023  Patient Name:  Judy Raza    :  1970  MRN: [id number]  Physician: Latrell Hawk MD                                 Insurance: [employer] visit limit  Medical Diagnosis: Squamous cell carcinoma of left tonsil            Rehab Codes: C09.9, R29.3, G89.3, M54.2  Onset date: 23               Next Dr's appt.: ongoing  Visit# / total visits: 3/18    Cancels/No Shows: 0    Subjective: Pt arrives stating he is starting to notice a \"difference\" with swallowing along with increased dryness of the throat. Notes he has not been sleeping well d/t feeling anxious. Not as consistent to HEP however is staying hydrated and moisturized. Very active with yardwork, arrives with increased redness of the face. Also mentioned that he looked into the [facility] and will be utilizing resources. Pain:  [x] Yes  [] No Location: L shoulder/scapula, L and neck Pain Rating: (0-10 scale) 2/10  Pain altered Tx:  [x] No  [] Yes  Action:  Comments:    Objective:  Modalities:   Precautions: Squamous cell carcinoma of left tonsil, s/p tonsilectomy and LND 2+/38 @ [facility] 3/6/23. No chemo. 7 weeks radiation 23-23  Manual: cerv PROM/stretches, cerv down glides, side glides, manual traction, UT/levator stretch, SOR.

## 2023-04-26 ENCOUNTER — APPOINTMENT (OUTPATIENT)
Dept: RADIATION ONCOLOGY | Age: 53
End: 2023-04-26
Payer: COMMERCIAL

## 2023-04-26 ENCOUNTER — HOSPITAL ENCOUNTER (OUTPATIENT)
Dept: RADIATION ONCOLOGY | Age: 53
Discharge: HOME | End: 2023-04-26
Payer: COMMERCIAL

## 2023-04-26 VITALS
RESPIRATION RATE: 16 BRPM | WEIGHT: 184.4 LBS | TEMPERATURE: 98 F | HEART RATE: 54 BPM | BODY MASS INDEX: 28.88 KG/M2 | DIASTOLIC BLOOD PRESSURE: 83 MMHG | OXYGEN SATURATION: 99 % | SYSTOLIC BLOOD PRESSURE: 139 MMHG

## 2023-04-26 PROCEDURE — 77336 RADIATION PHYSICS CONSULT: CPT | Performed by: RADIOLOGY

## 2023-04-26 PROCEDURE — 77386 HC NTSTY MODUL RAD TX DLVR CPLX: CPT | Performed by: RADIOLOGY

## 2023-04-26 PROCEDURE — 77427 RADIATION TX MANAGEMENT X5: CPT | Performed by: RADIOLOGY

## 2023-04-26 PROCEDURE — 77014 CHG CT GUIDANCE RADIATION THERAPY FLDS PLACEMENT: CPT | Performed by: RADIOLOGY

## 2023-04-26 NOTE — PROGRESS NOTES
[facility]  4/26/2023  Wt Readings from Last 3 Encounters:   04/26/23 184 lb 6.4 oz (83.6 kg)   04/19/23 185 lb 9.6 oz (84.2 kg)   04/19/23 187 lb (84.8 kg)     Body mass index is 28.88 kg/m². Treatment Area:left tonsil/RT only    Patient was seen today for weekly visit. Comfort Alteration  Fatigue: None    Mucous Membrane Alteration  Mucositis Due to Radiation: No  Thrush: No  Voice Changes: No    Nutritional Alteration  Anorexia: No   Nausea: No   Vomiting: No     Ventilation Alteration  Cough:No    Skin Alteration   Sensation:intact    Radiation Dermatitis:  Intact [x]     Erythema  []     Discoloration  []     Rash []     Dry desquamation  []     Moist desquamation []       Emotional  Coping: effective      Injury, potential bleeding or infection: Skin care reinforced. Lab Results   Component Value Date    WBC 6.5 03/14/2023     03/14/2023         /83   Pulse 54   Temp 98 °F (36.7 °C) (Temporal)   Resp 16   Wt 184 lb 6.4 oz (83.6 kg)   SpO2 99%   BMI 28.88 kg/m²      Pain Assessment: None - Denies Pain            Assessment/Plan: Patient was seen today for weekly visit. He arrives ambulatory with steady gait. States starting to experience dry mouth. Writer encouraged use of alcohol free rinse such as Biotene. Oral mucosa intact. Reinforced use Betamethasone and Aquaphor and massage skin on side of neck. He voices understanding. Dr. Marcus Guerrero notified of assessment and examined patient. No change in treatment plan.     Tim Garcia RN
PSYCH: Mood normal, behavior normal.      LABS:  WBC   Date Value Ref Range Status   03/14/2023 6.5 10^3/mL Final   01/28/2023 4.7 3.5 - 11.0 k/uL Final   04/10/2013 7.5 10^3/mL Final     Segs Absolute   Date Value Ref Range Status   01/28/2023 3.10 1.8 - 7.7 k/uL Final     Hemoglobin   Date Value Ref Range Status   03/14/2023 12.4 (A) 13.5 - 17.5 g/dL Final   01/28/2023 13.8 13.5 - 17.5 g/dL Final   04/10/2013 15.3 13.5 - 17.5 g/dL Final     Platelets   Date Value Ref Range Status   03/14/2023 320 K/µL Final   01/28/2023 214 140 - 450 k/uL Final   04/10/2013 220 K/µL Final     Creatinine   Date Value Ref Range Status   03/14/2023 1.00  Final   01/28/2023 0.93 0.70 - 1.20 mg/dL Final   04/10/2013 1.30  Final       MEDICATIONS:    Current Outpatient Medications:     doxycycline hyclate (VIBRAMYCIN) 100 MG capsule, Take 1 capsule by mouth daily (Patient not taking: Reported on 4/19/2023), Disp: , Rfl:     betamethasone valerate (VALISONE) 0.1 % cream, Apply topically 2 times daily. (Patient not taking: Reported on 4/19/2023), Disp: 45 g, Rfl: 2    acetaminophen (TYLENOL) 325 MG tablet, Take 2 tablets by mouth every 6 hours as needed for Pain, Disp: , Rfl:     tamsulosin (FLOMAX) 0.4 MG capsule, Take 2 capsules by mouth daily. (Patient not taking: Reported on 2/3/2023), Disp: 60 capsule, Rfl: 5      ASSESSMENT PLAN:   Treatment setup and plan reviewed. Port images/CBCT images reviewed. Appropriate laboratory work was reviewed. Treatment side effects and toxicities reviewed with the patient, and appropriate management was advised. Will continue radiation treatment as planned, and recommend patient contact us if they have any questions or concerns. Continue external beam radiation therapy as planned. Tolerating therapy as expected. Encourage patient to maintain his weight. He continues to tolerate oral diet. Skin care with Aquaphor and betamethasone.   Oral rinses with salt and water and baking

## 2023-04-27 ENCOUNTER — APPOINTMENT (OUTPATIENT)
Dept: RADIATION ONCOLOGY | Age: 53
End: 2023-04-27
Payer: COMMERCIAL

## 2023-04-27 ENCOUNTER — HOSPITAL ENCOUNTER (OUTPATIENT)
Dept: RADIATION ONCOLOGY | Age: 53
Discharge: HOME | End: 2023-04-27
Payer: COMMERCIAL

## 2023-04-27 PROCEDURE — 77386 HC NTSTY MODUL RAD TX DLVR CPLX: CPT | Performed by: RADIOLOGY

## 2023-04-28 ENCOUNTER — APPOINTMENT (OUTPATIENT)
Dept: RADIATION ONCOLOGY | Age: 53
End: 2023-04-28
Payer: COMMERCIAL

## 2023-04-28 ENCOUNTER — HOSPITAL ENCOUNTER (OUTPATIENT)
Dept: RADIATION ONCOLOGY | Age: 53
Discharge: HOME | End: 2023-04-28
Payer: COMMERCIAL

## 2023-04-28 PROCEDURE — 77386 HC NTSTY MODUL RAD TX DLVR CPLX: CPT | Performed by: RADIOLOGY

## 2023-05-01 ENCOUNTER — APPOINTMENT (OUTPATIENT)
Dept: RADIATION ONCOLOGY | Age: 53
End: 2023-05-01
Payer: COMMERCIAL

## 2023-05-01 ENCOUNTER — HOSPITAL ENCOUNTER (OUTPATIENT)
Dept: RADIATION ONCOLOGY | Age: 53
Discharge: HOME | End: 2023-05-01
Payer: COMMERCIAL

## 2023-05-01 PROCEDURE — 77386 HC NTSTY MODUL RAD TX DLVR CPLX: CPT | Performed by: RADIOLOGY

## 2023-05-02 ENCOUNTER — APPOINTMENT (OUTPATIENT)
Dept: RADIATION ONCOLOGY | Age: 53
End: 2023-05-02
Payer: COMMERCIAL

## 2023-05-02 ENCOUNTER — HOSPITAL ENCOUNTER (OUTPATIENT)
Dept: RADIATION ONCOLOGY | Age: 53
Discharge: HOME | End: 2023-05-02
Payer: COMMERCIAL

## 2023-05-02 ENCOUNTER — HOSPITAL ENCOUNTER (OUTPATIENT)
Dept: PHYSICAL THERAPY | Facility: CLINIC | Age: 53
Setting detail: THERAPIES SERIES
Discharge: HOME | End: 2023-05-02
Payer: COMMERCIAL

## 2023-05-02 PROCEDURE — 97110 THERAPEUTIC EXERCISES: CPT

## 2023-05-02 PROCEDURE — 97140 MANUAL THERAPY 1/> REGIONS: CPT

## 2023-05-02 PROCEDURE — 77386 HC NTSTY MODUL RAD TX DLVR CPLX: CPT | Performed by: RADIOLOGY

## 2023-05-02 NOTE — FLOWSHEET NOTE
[] [facility]) [facility] &  Therapy  [address]  [phone]  F: [phone] [] [address]  [facility]   [address]  P: [phone]  F: [phone] [x] [facility]  Outpatient Rehabilitation &  Therapy  [address]  P: [phone]  F: [phone] [] [address]  P: [phone]  F: [phone] [] [address]  [facility]   [address]   [place]: [phone]  F: [phone]      Physical Therapy Daily Treatment Note    Date:  2023  Patient Name:  Nader Jaramillo    :  1970  MRN: [id number]  Physician: Ze Ledesma MD                                 Insurance: [employer] visit limit  Medical Diagnosis: Squamous cell carcinoma of left tonsil            Rehab Codes: C09.9, R29.3, G89.3, M54.2  Onset date: 23               Next Dr's appt.: ongoing  Visit# / total visits:     Cancels/No Shows: 0    Subjective: Pt states he is having some throat pain, a hard time swallowing, incr dryness, some difficulty chewing and swallowing. Notes he hasn't been consistent about HEP and sleep has been on and off. Utilizing the [facility]. Pain:  [x] Yes  [] No Location: L shoulder/scapula, L and neck Pain Rating: (0-10 scale) 3-4/10  Pain altered Tx:  [x] No  [] Yes  Action:  Comments:    Objective:  Modalities:   Precautions: Squamous cell carcinoma of left tonsil, s/p tonsilectomy and LND 2+/38 @ [facility] 3/6/23. No chemo. 7 weeks radiation 23-23  Manual: cerv PROM/stretches, cerv down glides, side glides, manual traction, UT/levator stretch, SOR. MFR/CST SCM, scalenes, OCB.  MLD and deep lymphatics (omohyoid, digastric and internal/external ear, sternum, pec and axillary) to H&N. L GH mobs post, inf, PROM, partial

## 2023-05-03 ENCOUNTER — HOSPITAL ENCOUNTER (OUTPATIENT)
Dept: RADIATION ONCOLOGY | Age: 53
Discharge: HOME | End: 2023-05-03
Payer: COMMERCIAL

## 2023-05-03 ENCOUNTER — APPOINTMENT (OUTPATIENT)
Dept: RADIATION ONCOLOGY | Age: 53
End: 2023-05-03
Payer: COMMERCIAL

## 2023-05-03 VITALS
RESPIRATION RATE: 14 BRPM | WEIGHT: 180.8 LBS | SYSTOLIC BLOOD PRESSURE: 139 MMHG | TEMPERATURE: 98.3 F | BODY MASS INDEX: 28.32 KG/M2 | HEART RATE: 80 BPM | DIASTOLIC BLOOD PRESSURE: 74 MMHG

## 2023-05-03 DIAGNOSIS — C09.9 SQUAMOUS CELL CARCINOMA OF LEFT TONSIL (HCC): Primary | ICD-10-CM

## 2023-05-03 PROCEDURE — 77336 RADIATION PHYSICS CONSULT: CPT | Performed by: RADIOLOGY

## 2023-05-03 PROCEDURE — 77386 HC NTSTY MODUL RAD TX DLVR CPLX: CPT | Performed by: RADIOLOGY

## 2023-05-03 ASSESSMENT — PAIN DESCRIPTION - LOCATION: LOCATION: THROAT

## 2023-05-03 ASSESSMENT — PAIN SCALES - GENERAL: PAINLEVEL_OUTOF10: 3

## 2023-05-03 NOTE — PROGRESS NOTES
Rhoda Pizarro  5/3/2023  Wt Readings from Last 3 Encounters:   05/03/23 180 lb 12.8 oz (82 kg)   04/26/23 184 lb 6.4 oz (83.6 kg)   04/19/23 185 lb 9.6 oz (84.2 kg)     Body mass index is 28.32 kg/m². Treatment Area:left tonsil/RT only    Patient was seen today for weekly visit. Comfort Alteration  Fatigue: Mild    Mucous Membrane Alteration  Mucositis Due to Radiation: Yes  Thrush: No  Voice Changes: No    Nutritional Alteration  Anorexia: No   Nausea: No   Vomiting: No     Ventilation Alteration  Cough:No    Skin Alteration   Sensation: Intact    Radiation Dermatitis:  Intact [x]     Erythema  [x]     Discoloration  []     Rash []     Dry desquamation  []     Moist desquamation []       Emotional  Coping: effective      Injury, potential bleeding or infection: Skin care reinforced. Lab Results   Component Value Date    WBC 6.5 03/14/2023     03/14/2023         /74   Pulse 80   Temp 98.3 °F (36.8 °C) (Temporal)   Resp 14   Wt 180 lb 12.8 oz (82 kg)   BMI 28.32 kg/m²      Pain Assessment: 0-10  Pain Level: 3         Assessment/Plan: Patient was seen today for weekly visit. Reports dry mouth, thicker secretions, and taste changes. More pain to the left side of his tongue and left cheek with irritation noted. Instructed to use salt/baking soda rinses and reminded to make sure he is using his Betamethasone cream as instructed. Feeling more fatigued. Support offered.     Randall Kawasaki, RN

## 2023-05-03 NOTE — PROGRESS NOTES
[facility]       Radiation Oncology          [address] [place] [address]          [place] [address] [facility] [address]        Chloe Vizcarra: [phone]        F: [phone]       ROKT             RADIATION ONCOLOGY WEEKLY PROGRESS NOTE  Patient ID:   Dewey Bai  : 1970   MRN: [id number]    Location:  [facility]iation Oncology,   [facility] [place] [address] [place]   [phone]    DIAGNOSIS:  Cancer Staging   Squamous cell carcinoma of left tonsil ([facility] [address]  Staging form: Pharynx - HPV-Mediated Oropharynx, AJCC 8th Edition  - Clinical stage from 2023: Stage II (cT1, cN2, cM0, p16+) - Signed by Shira Victor MD on 2023      TREATMENT DETAILS:  Treatment Site: head and neck  Actual Dose: 2600 cGy  Total Planned Dose: 6000 cGy  Treatment Technique: IMRT  Fraction Technique: Daily  Therapy imaging monitoring: CBCT daily  Concurrent Chemotherapy: None    SUBJECTIVE:   Patient seen for their weekly on treatment evaluation today. Reports mild fatigue, xerostomia and thick secretions. Tolerating oral diet, though he does note more pain and difficulty with swallowing pills. His skin is more irritated and    OBJECTIVE:     ECO Asymptomatic    VITAL SIGNS: /74   Pulse 80   Temp 98.3 °F (36.8 °C) (Temporal)   Resp 14   Wt 180 lb 12.8 oz (82 kg)   BMI 28.32 kg/m²   Wt Readings from Last 5 Encounters:   23 180 lb 12.8 oz (82 kg)   23 184 lb 6.4 oz (83.6 kg)   23 185 lb 9.6 oz (84.2 kg)   23 187 lb (84.8 kg)   23 183 lb (83 kg)     GENERAL:  General appearance is that of a well-nourished, well-developed in no apparent distress. HEENT: (+) L side mucositis of the oral cavity   Mild skin erythema of neck. HEART:  Normal rate and regular rhythm  LUNGS:  Pulmonary effort normal.  ABDOMEN:  Soft, nontender, non distended  EXTREMITIES:  No edema. No calf tenderness. MSK:  No spinal tenderness. Normal ROM.   NEUROLOGICAL: Alert and

## 2023-05-04 ENCOUNTER — APPOINTMENT (OUTPATIENT)
Dept: RADIATION ONCOLOGY | Age: 53
End: 2023-05-04
Payer: COMMERCIAL

## 2023-05-04 ENCOUNTER — HOSPITAL ENCOUNTER (OUTPATIENT)
Dept: RADIATION ONCOLOGY | Age: 53
Discharge: HOME | End: 2023-05-04
Payer: COMMERCIAL

## 2023-05-04 PROCEDURE — 77386 HC NTSTY MODUL RAD TX DLVR CPLX: CPT | Performed by: RADIOLOGY

## 2023-05-05 ENCOUNTER — APPOINTMENT (OUTPATIENT)
Dept: RADIATION ONCOLOGY | Age: 53
End: 2023-05-05
Payer: COMMERCIAL

## 2023-05-05 ENCOUNTER — HOSPITAL ENCOUNTER (OUTPATIENT)
Dept: RADIATION ONCOLOGY | Age: 53
Discharge: HOME | End: 2023-05-05
Payer: COMMERCIAL

## 2023-05-05 PROCEDURE — 77386 HC NTSTY MODUL RAD TX DLVR CPLX: CPT | Performed by: RADIOLOGY

## 2023-05-08 ENCOUNTER — APPOINTMENT (OUTPATIENT)
Dept: RADIATION ONCOLOGY | Age: 53
End: 2023-05-08
Payer: COMMERCIAL

## 2023-05-08 ENCOUNTER — HOSPITAL ENCOUNTER (OUTPATIENT)
Dept: RADIATION ONCOLOGY | Age: 53
Discharge: HOME | End: 2023-05-08
Payer: COMMERCIAL

## 2023-05-08 PROCEDURE — 77386 HC NTSTY MODUL RAD TX DLVR CPLX: CPT | Performed by: RADIOLOGY

## 2023-05-09 ENCOUNTER — HOSPITAL ENCOUNTER (OUTPATIENT)
Dept: PHYSICAL THERAPY | Facility: CLINIC | Age: 53
Setting detail: THERAPIES SERIES
Discharge: HOME | End: 2023-05-09
Payer: COMMERCIAL

## 2023-05-09 ENCOUNTER — HOSPITAL ENCOUNTER (OUTPATIENT)
Dept: RADIATION ONCOLOGY | Age: 53
Discharge: HOME | End: 2023-05-09
Payer: COMMERCIAL

## 2023-05-09 ENCOUNTER — APPOINTMENT (OUTPATIENT)
Dept: RADIATION ONCOLOGY | Age: 53
End: 2023-05-09
Payer: COMMERCIAL

## 2023-05-09 PROCEDURE — 97110 THERAPEUTIC EXERCISES: CPT

## 2023-05-09 PROCEDURE — 77386 HC NTSTY MODUL RAD TX DLVR CPLX: CPT | Performed by: RADIOLOGY

## 2023-05-09 PROCEDURE — 97140 MANUAL THERAPY 1/> REGIONS: CPT

## 2023-05-09 NOTE — FLOWSHEET NOTE
3. ? Strength: Optimize L UE strength and demo good scapular/postural and core stability              4. ? Function: Pt to report improved sleep and ability to complete ADL's, lift/carry and complete household chores w/o difficulty              5. Independent with Home Exercise Program as demonstrated by performance with correct form without cues. LTG: (to be met in 18+ treatments)        1. Maintain BFI score by for improved mikey to activity and decr UW-QOL score by 5% for overall improved function        2. Maintain cervical soft tissue extensibility to allow for more normal motion and function        3. Manage/mitigate side effects/late effects of Cancer treatment        4. Continue activity to decrease risk factors associated with increased time being sedentary while undergoing chemotherapy, radiation or surgery                  Patient goals: Feel good! Pt. Education:  [x] Yes  [] No  [x] Reviewed Prior HEP/Ed  Method of Education: [x] Verbal  [x] Demo  [x] Written- HEP  Comprehension of Education:  [x] Verbalizes understanding. [] Demonstrates understanding. [] Needs review. [] Demonstrates/verbalizes HEP/Ed previously given. Plan: [x] Continue current frequency toward long and short term goals.     [x] Specific Instructions for subsequent treatments: cont w/ manual, progress as mikey      Time In: 11:01 am            Time Out: 12:03 pm    Electronically signed by:  Chin Tolliver PT

## 2023-05-10 ENCOUNTER — HOSPITAL ENCOUNTER (OUTPATIENT)
Dept: RADIATION ONCOLOGY | Age: 53
Discharge: HOME | End: 2023-05-10
Payer: COMMERCIAL

## 2023-05-10 ENCOUNTER — APPOINTMENT (OUTPATIENT)
Dept: RADIATION ONCOLOGY | Age: 53
End: 2023-05-10
Payer: COMMERCIAL

## 2023-05-10 VITALS
BODY MASS INDEX: 27.16 KG/M2 | RESPIRATION RATE: 16 BRPM | OXYGEN SATURATION: 99 % | HEART RATE: 65 BPM | DIASTOLIC BLOOD PRESSURE: 70 MMHG | TEMPERATURE: 97.9 F | SYSTOLIC BLOOD PRESSURE: 127 MMHG | WEIGHT: 173.4 LBS

## 2023-05-10 PROCEDURE — 77386 HC NTSTY MODUL RAD TX DLVR CPLX: CPT | Performed by: RADIOLOGY

## 2023-05-10 NOTE — PROGRESS NOTES
Early Saner  5/10/2023  Wt Readings from Last 3 Encounters:   05/03/23 180 lb 12.8 oz (82 kg)   04/26/23 184 lb 6.4 oz (83.6 kg)   04/19/23 185 lb 9.6 oz (84.2 kg)     There is no height or weight on file to calculate BMI. Treatment Area:left tonsil/RT only    Patient was seen today for weekly visit. Comfort Alteration  Fatigue: Mild    Mucous Membrane Alteration  Mucositis Due to Radiation: Yes  Thrush: No  Voice Changes: No    Nutritional Alteration  Anorexia: No   Nausea: No   Vomiting: No     Ventilation Alteration  Cough:No    Skin Alteration   Sensation: Intact    Radiation Dermatitis:  Intact [x]     Erythema  [x]     Discoloration  []     Rash []     Dry desquamation  []     Moist desquamation []       Emotional  Coping: effective      Injury, potential bleeding or infection: Skin care reinforced. Lab Results   Component Value Date    WBC 6.5 03/14/2023     03/14/2023         There were no vitals taken for this visit. Assessment/Plan: Patient was seen today for weekly visit. He arrives ambulatory with steady gait. Reports dry mouth,taste changes and difficulty swallowing. Using Magic Mouthwash which is helping the pain with swallowing. Taking 3 containers of nutritional supplement /day. High protein/high calorie foods encouraged. States moderate fatigue. Emotional support and encouragement provided. Dr. Cindy Auguste examined patient. Continue current treatment plan.    Juan Lugo RN
Strength and sensation intact bilaterally. No focal deficits. PSYCH: Mood normal, behavior normal.      LABS:  WBC   Date Value Ref Range Status   03/14/2023 6.5 10^3/mL Final   01/28/2023 4.7 3.5 - 11.0 k/uL Final   04/10/2013 7.5 10^3/mL Final     Segs Absolute   Date Value Ref Range Status   01/28/2023 3.10 1.8 - 7.7 k/uL Final     Hemoglobin   Date Value Ref Range Status   03/14/2023 12.4 (A) 13.5 - 17.5 g/dL Final   01/28/2023 13.8 13.5 - 17.5 g/dL Final   04/10/2013 15.3 13.5 - 17.5 g/dL Final     Platelets   Date Value Ref Range Status   03/14/2023 320 K/µL Final   01/28/2023 214 140 - 450 k/uL Final   04/10/2013 220 K/µL Final     Creatinine   Date Value Ref Range Status   03/14/2023 1.00  Final   01/28/2023 0.93 0.70 - 1.20 mg/dL Final   04/10/2013 1.30  Final       MEDICATIONS:    Current Outpatient Medications:     Magic Mouthwash (MIRACLE MOUTHWASH), Swish and spit 5 mLs 4 times daily as needed for Irritation Benadryl Elixir, Maalox, viscous xylocaine, nystatin 1:1 mix, Disp: 480 mL, Rfl: 3    doxycycline hyclate (VIBRAMYCIN) 100 MG capsule, Take 1 capsule by mouth daily (Patient not taking: Reported on 4/19/2023), Disp: , Rfl:     betamethasone valerate (VALISONE) 0.1 % cream, Apply topically 2 times daily. (Patient not taking: Reported on 4/19/2023), Disp: 45 g, Rfl: 2    acetaminophen (TYLENOL) 325 MG tablet, Take 2 tablets by mouth every 6 hours as needed for Pain, Disp: , Rfl:     tamsulosin (FLOMAX) 0.4 MG capsule, Take 2 capsules by mouth daily. (Patient not taking: Reported on 2/3/2023), Disp: 60 capsule, Rfl: 5      ASSESSMENT PLAN:   Treatment setup and plan reviewed. Port images/CBCT images reviewed. Appropriate laboratory work was reviewed. Treatment side effects and toxicities reviewed with the patient, and appropriate management was advised. Will continue radiation treatment as planned, and recommend patient contact us if they have any questions or concerns.      Continue external beam

## 2023-05-11 ENCOUNTER — HOSPITAL ENCOUNTER (OUTPATIENT)
Dept: RADIATION ONCOLOGY | Age: 53
Discharge: HOME | End: 2023-05-11
Payer: COMMERCIAL

## 2023-05-11 ENCOUNTER — APPOINTMENT (OUTPATIENT)
Dept: RADIATION ONCOLOGY | Age: 53
End: 2023-05-11
Payer: COMMERCIAL

## 2023-05-11 PROCEDURE — 77386 HC NTSTY MODUL RAD TX DLVR CPLX: CPT | Performed by: RADIOLOGY

## 2023-05-12 ENCOUNTER — APPOINTMENT (OUTPATIENT)
Dept: RADIATION ONCOLOGY | Age: 53
End: 2023-05-12
Payer: COMMERCIAL

## 2023-05-12 ENCOUNTER — HOSPITAL ENCOUNTER (OUTPATIENT)
Dept: SPEECH THERAPY | Age: 53
Setting detail: THERAPIES SERIES
End: 2023-05-12
Payer: COMMERCIAL

## 2023-05-15 ENCOUNTER — HOSPITAL ENCOUNTER (OUTPATIENT)
Dept: RADIATION ONCOLOGY | Age: 53
Discharge: HOME | End: 2023-05-15
Payer: COMMERCIAL

## 2023-05-15 ENCOUNTER — APPOINTMENT (OUTPATIENT)
Dept: RADIATION ONCOLOGY | Age: 53
End: 2023-05-15
Payer: COMMERCIAL

## 2023-05-15 PROCEDURE — 77338 DESIGN MLC DEVICE FOR IMRT: CPT | Performed by: RADIOLOGY

## 2023-05-15 PROCEDURE — 77386 HC NTSTY MODUL RAD TX DLVR CPLX: CPT | Performed by: RADIOLOGY

## 2023-05-15 PROCEDURE — 77300 RADIATION THERAPY DOSE PLAN: CPT | Performed by: RADIOLOGY

## 2023-05-16 ENCOUNTER — HOSPITAL ENCOUNTER (OUTPATIENT)
Dept: RADIATION ONCOLOGY | Age: 53
Discharge: HOME | End: 2023-05-16
Payer: COMMERCIAL

## 2023-05-16 ENCOUNTER — HOSPITAL ENCOUNTER (OUTPATIENT)
Dept: PHYSICAL THERAPY | Facility: CLINIC | Age: 53
Setting detail: THERAPIES SERIES
Discharge: HOME | End: 2023-05-16
Payer: COMMERCIAL

## 2023-05-16 ENCOUNTER — APPOINTMENT (OUTPATIENT)
Dept: RADIATION ONCOLOGY | Age: 53
End: 2023-05-16
Payer: COMMERCIAL

## 2023-05-16 PROCEDURE — 97140 MANUAL THERAPY 1/> REGIONS: CPT

## 2023-05-16 PROCEDURE — 97110 THERAPEUTIC EXERCISES: CPT

## 2023-05-16 PROCEDURE — 77386 HC NTSTY MODUL RAD TX DLVR CPLX: CPT | Performed by: RADIOLOGY

## 2023-05-16 NOTE — FLOWSHEET NOTE
[] [facility]) [facility] &  Therapy  [address]  [phone]  F: [phone] [] [address]  [facility]   [address]  P: [phone]  F: [phone] [x] [address]  [address]  P: [phone]  F: [phone] [] [address]  P: [phone]  F: [phone] [] [address]  [facility]   [address]   [place]: [phone]  F: [phone]      Physical Therapy Daily Treatment Note    Date:  2023  Patient Name:  Elie Rojas    :  1970  MRN: [id number]  Physician: Harper Pelletier MD                                 Insurance: [employer] 20 visit limit  Medical Diagnosis: Squamous cell carcinoma of left tonsil            Rehab Codes: C09.9, R29.3, G89.3, M54.2  Onset date: 23               Next Dr's appt.: ongoing  Visit# / total visits:     Cancels/No Shows: 0    Subjective: Pt cont's w/ throat pain and hard time swallowing but states eating ok. States skin is burnt and peeling L neck and incr dryness. Notes compliance w/ HEP. Has 9 more sessions to go. Pain:  [x] Yes  [] No Location: L shoulder/scapula, L and neck Pain Rating: (0-10 scale) 3/10  Pain altered Tx:  [x] No  [] Yes  Action:  Comments:    Objective:  Modalities:   Precautions: Squamous cell carcinoma of left tonsil, s/p tonsilectomy and LND 2+/38 @ [facility] 3/6/23. No chemo. 7 weeks radiation 23-23  Manual: cerv PROM/stretches, cerv down glides, side glides, manual traction, UT/levator stretch, SOR. MFR/CST SCM, scalenes, OCB. MLD and deep lymphatics (\"waddle,\" omohyoid, digastric and internal/external ear, sternum, pec and axillary) to H&N.  No-L GH mobs post, inf, PROM, partial PORi protocol to L upper quarter in

## 2023-05-17 ENCOUNTER — APPOINTMENT (OUTPATIENT)
Dept: RADIATION ONCOLOGY | Age: 53
End: 2023-05-17
Payer: COMMERCIAL

## 2023-05-17 ENCOUNTER — HOSPITAL ENCOUNTER (OUTPATIENT)
Dept: RADIATION ONCOLOGY | Age: 53
Discharge: HOME | End: 2023-05-17
Payer: COMMERCIAL

## 2023-05-17 ENCOUNTER — TELEPHONE (OUTPATIENT)
Dept: ONCOLOGY | Age: 53
End: 2023-05-17

## 2023-05-17 VITALS
WEIGHT: 173.6 LBS | RESPIRATION RATE: 16 BRPM | OXYGEN SATURATION: 100 % | DIASTOLIC BLOOD PRESSURE: 77 MMHG | TEMPERATURE: 98 F | HEART RATE: 56 BPM | SYSTOLIC BLOOD PRESSURE: 118 MMHG | BODY MASS INDEX: 27.19 KG/M2

## 2023-05-17 PROCEDURE — 77386 HC NTSTY MODUL RAD TX DLVR CPLX: CPT | Performed by: RADIOLOGY

## 2023-05-17 PROCEDURE — 77336 RADIATION PHYSICS CONSULT: CPT | Performed by: RADIOLOGY

## 2023-05-17 ASSESSMENT — PAIN DESCRIPTION - LOCATION: LOCATION: THROAT

## 2023-05-17 ASSESSMENT — PAIN SCALES - GENERAL: PAINLEVEL_OUTOF10: 3

## 2023-05-17 NOTE — PROGRESS NOTES
[address]       Radiation Oncology          [address] [facility]          [address]        Julieta Yee: [phone]        F: [phone]       [email or web address]             RADIATION ONCOLOGY WEEKLY PROGRESS NOTE  Patient ID:   Amanda Ty  : 1970   MRN: [id number]    Location:  [facility] Radiation Oncology,   [address] [facility] [place]pos   [phone]    DIAGNOSIS:  Cancer Staging   Squamous cell carcinoma of left tonsil ([facility] [address]  Staging form: Pharynx - HPV-Mediated Oropharynx, AJCC 8th Edition  - Clinical stage from 2023: Stage II (cT1, cN2, cM0, p16+) - Signed by Dhiraj Suarez MD on 2023      TREATMENT DETAILS:  Treatment Site: head and neck  Actual Dose: 4400 cGy  Total Planned Dose: 6000 cGy  Treatment Technique: IMRT  Fraction Technique: Daily  Therapy imaging monitoring: CBCT daily  Concurrent Chemotherapy: None    SUBJECTIVE:   Patient seen for their weekly on treatment evaluation today. Reports mild fatigue, xerostomia and metallic taste. Tolerating oral diet still eating regular solids. His skin is more irritated and has some dry desquamation on the neck. OBJECTIVE:     ECO Asymptomatic    VITAL SIGNS: /77   Pulse 56   Temp 98 °F (36.7 °C) (Temporal)   Resp 16   Wt 173 lb 9.6 oz (78.7 kg)   SpO2 100%   BMI 27.19 kg/m²   Wt Readings from Last 5 Encounters:   23 173 lb 9.6 oz (78.7 kg)   05/10/23 173 lb 6.4 oz (78.7 kg)   23 180 lb 12.8 oz (82 kg)   23 184 lb 6.4 oz (83.6 kg)   23 185 lb 9.6 oz (84.2 kg)     GENERAL:  General appearance is that of a well-nourished, well-developed in no apparent distress. HEENT: (+) L side mucositis of the oral cavity and palate. Brisk diffuse skin erythema of neck. (+) L neck dry desquamation. HEART:  Normal rate and regular rhythm  LUNGS:  Pulmonary effort normal.  ABDOMEN:  Soft, nontender, non distended  EXTREMITIES:  No edema.   No calf

## 2023-05-17 NOTE — TELEPHONE ENCOUNTER
Name: Vicky Ruiz  : 1970  MRN: [id number]    Oncology Navigation Follow-Up Note    Contact Type:  Radiation Oncology    Notes: Pt @ [facility] for XRT. Met w/pt prior to XRT. Pt denied questions/concerns. Encouraged to contact writer prn. Will continue to follow.     Electronically signed by Aneudy Urias RN on 2023 at 10:46 AM

## 2023-05-17 NOTE — PROGRESS NOTES
Aracelymart Savage  5/17/2023  Wt Readings from Last 3 Encounters:   05/17/23 173 lb 9.6 oz (78.7 kg)   05/10/23 173 lb 6.4 oz (78.7 kg)   05/03/23 180 lb 12.8 oz (82 kg)     Body mass index is 27.19 kg/m². Treatment Area:left tonsil/RT only    Patient was seen today for weekly visit. Comfort Alteration  Fatigue: Mild    Mucous Membrane Alteration  Mucositis Due to Radiation: Yes  Thrush: No  Voice Changes: No    Nutritional Alteration  Anorexia: No   Nausea: No   Vomiting: No     Ventilation Alteration  Cough:No    Skin Alteration   Sensation: Intact    Radiation Dermatitis:  Intact [x]     Erythema  [x]     Discoloration  []     Rash []     Dry desquamation  [x]   Left neck  Moist desquamation []       Emotional  Coping: effective      Injury, potential bleeding or infection: Skin care reinforced. Lab Results   Component Value Date    WBC 6.5 03/14/2023     03/14/2023         /77   Pulse 56   Temp 98 °F (36.7 °C) (Temporal)   Resp 16   Wt 173 lb 9.6 oz (78.7 kg)   SpO2 100%   BMI 27.19 kg/m²      Pain Assessment: 0-10  Pain Level: 3         Assessment/Plan: Patient was seen today for weekly visit. He arrives ambulatory with steady gait. Eating all textures of food. Reports mouth not as dry and ,taste changes -everything tastes \"metallic\". Occasionally using Magic Mouthwash which is helping the pain with swallowing. Taking 2 containers of nutritional supplement /day. High protein/high calorie foods encouraged. States moderate fatigue. Left neck with small amount dry desquamation. Encouraged to moisturize skin more frequently Emotional support and encouragement provided. Dr. Jacque Garza examined patient. Continue current treatment plan.    Mariano Moreira RN

## 2023-05-18 ENCOUNTER — HOSPITAL ENCOUNTER (OUTPATIENT)
Dept: RADIATION ONCOLOGY | Age: 53
Discharge: HOME | End: 2023-05-18
Payer: COMMERCIAL

## 2023-05-18 ENCOUNTER — APPOINTMENT (OUTPATIENT)
Dept: RADIATION ONCOLOGY | Age: 53
End: 2023-05-18
Payer: COMMERCIAL

## 2023-05-18 PROCEDURE — 77386 HC NTSTY MODUL RAD TX DLVR CPLX: CPT | Performed by: RADIOLOGY

## 2023-05-19 ENCOUNTER — HOSPITAL ENCOUNTER (OUTPATIENT)
Dept: RADIATION ONCOLOGY | Age: 53
Discharge: HOME | End: 2023-05-19
Payer: COMMERCIAL

## 2023-05-19 ENCOUNTER — APPOINTMENT (OUTPATIENT)
Dept: RADIATION ONCOLOGY | Age: 53
End: 2023-05-19
Payer: COMMERCIAL

## 2023-05-19 PROCEDURE — 77386 HC NTSTY MODUL RAD TX DLVR CPLX: CPT | Performed by: RADIOLOGY

## 2023-05-22 ENCOUNTER — APPOINTMENT (OUTPATIENT)
Dept: RADIATION ONCOLOGY | Age: 53
End: 2023-05-22
Payer: COMMERCIAL

## 2023-05-22 ENCOUNTER — HOSPITAL ENCOUNTER (OUTPATIENT)
Dept: RADIATION ONCOLOGY | Age: 53
Discharge: HOME | End: 2023-05-22
Payer: COMMERCIAL

## 2023-05-22 PROCEDURE — 77386 HC NTSTY MODUL RAD TX DLVR CPLX: CPT | Performed by: RADIOLOGY

## 2023-05-23 ENCOUNTER — HOSPITAL ENCOUNTER (OUTPATIENT)
Dept: RADIATION ONCOLOGY | Age: 53
Discharge: HOME | End: 2023-05-23
Payer: COMMERCIAL

## 2023-05-23 ENCOUNTER — APPOINTMENT (OUTPATIENT)
Dept: RADIATION ONCOLOGY | Age: 53
End: 2023-05-23
Payer: COMMERCIAL

## 2023-05-23 ENCOUNTER — HOSPITAL ENCOUNTER (OUTPATIENT)
Dept: PHYSICAL THERAPY | Facility: CLINIC | Age: 53
Setting detail: THERAPIES SERIES
Discharge: HOME | End: 2023-05-23
Payer: COMMERCIAL

## 2023-05-23 PROCEDURE — 77386 HC NTSTY MODUL RAD TX DLVR CPLX: CPT | Performed by: RADIOLOGY

## 2023-05-23 PROCEDURE — 97140 MANUAL THERAPY 1/> REGIONS: CPT

## 2023-05-23 PROCEDURE — 97110 THERAPEUTIC EXERCISES: CPT

## 2023-05-23 NOTE — FLOWSHEET NOTE
[] [facility] &  Therapy  [address]  [phone]  F: [phone] [] [address]  [facility] 36   [address]  P: [phone]  F: [phone] [x] [address]  [address]  P: [phone]  F: [phone] [] [address]  P: [phone]  F: [phone] [] [address]  Psychiatric   [address]   [place]: [phone]  F: [phone]      Physical Therapy Daily Treatment Note    Date:  2023  Patient Name:  Renetta Connolly    :  1970  MRN: [id number]  Physician: Beverley Garces MD                                 Insurance: [employer] visit limit  Medical Diagnosis: Squamous cell carcinoma of left tonsil            Rehab Codes: C09.9, R29.3, G89.3, M54.2  Onset date: 23               Next Dr's appt.: ongoing  Visit# / total visits:     Cancels/No Shows: 0    Subjective: Pt cont's w/ throat pain and hard time swallowing, states phlegm has gotten really thick preethi in the morning. Cont's to eat  and drink w/o issue despite everything having no taste. Has 4 more sessions to go. Pain:  [x] Yes  [] No Location: L shoulder/scapula, L and neck Pain Rating: (0-10 scale) 3/10  Pain altered Tx:  [x] No  [] Yes  Action:  Comments:    Objective:  Modalities:   Precautions: Squamous cell carcinoma of left tonsil, s/p tonsilectomy and LND 2+/38 @ [facility] 3/6/23. No chemo. 7 weeks radiation 23-23  Manual: cerv PROM/stretches, cerv down glides, side glides, manual traction, UT/levator stretch, SOR. MFR/CST SCM, scalenes, OCB. MLD and deep lymphatics (\"waddle,\" omohyoid, digastric and internal/external ear, sternum, pec and axillary) to H&N.  No-L GH mobs post, inf, PROM, partial PORi protocol to

## 2023-05-24 ENCOUNTER — TELEPHONE (OUTPATIENT)
Dept: ONCOLOGY | Age: 53
End: 2023-05-24

## 2023-05-24 ENCOUNTER — HOSPITAL ENCOUNTER (OUTPATIENT)
Dept: RADIATION ONCOLOGY | Age: 53
Discharge: HOME | End: 2023-05-24
Payer: COMMERCIAL

## 2023-05-24 ENCOUNTER — APPOINTMENT (OUTPATIENT)
Dept: RADIATION ONCOLOGY | Age: 53
End: 2023-05-24
Payer: COMMERCIAL

## 2023-05-24 VITALS
BODY MASS INDEX: 26.78 KG/M2 | HEART RATE: 56 BPM | RESPIRATION RATE: 16 BRPM | DIASTOLIC BLOOD PRESSURE: 83 MMHG | WEIGHT: 171 LBS | SYSTOLIC BLOOD PRESSURE: 134 MMHG

## 2023-05-24 PROCEDURE — 77386 HC NTSTY MODUL RAD TX DLVR CPLX: CPT | Performed by: RADIOLOGY

## 2023-05-24 ASSESSMENT — PAIN SCALES - GENERAL: PAINLEVEL_OUTOF10: 3

## 2023-05-24 ASSESSMENT — PAIN DESCRIPTION - LOCATION: LOCATION: THROAT

## 2023-05-24 NOTE — PROGRESS NOTES
Shubertlewis Moralez  5/24/2023  Wt Readings from Last 3 Encounters:   05/24/23 171 lb (77.6 kg)   05/17/23 173 lb 9.6 oz (78.7 kg)   05/10/23 173 lb 6.4 oz (78.7 kg)     Body mass index is 26.78 kg/m². Treatment Area:left tonsil/RT only    Patient was seen today for weekly visit. Comfort Alteration  Fatigue: Mild    Mucous Membrane Alteration  Mucositis Due to Radiation: Yes  Thrush: No  Voice Changes: No    Nutritional Alteration  Anorexia: No   Nausea: No   Vomiting: No     Ventilation Alteration  Cough:No    Skin Alteration   Sensation: Intact    Radiation Dermatitis:  Intact []     Erythema  [x]     Discoloration  []     Rash []     Dry desquamation  [x]   Left neck  Moist desquamation []       Emotional  Coping: effective      Injury, potential bleeding or infection: Skin care reinforced. Lab Results   Component Value Date    WBC 6.5 03/14/2023     03/14/2023         /83   Pulse 56   Resp 16   Wt 171 lb (77.6 kg)   BMI 26.78 kg/m²         Pain Level: 3         Assessment/Plan: Patient was seen today for weekly visit. He continues to have discomfort to the left side of inside of his mouth. Not using Magic Mouthwash or needing anything else for this currently. Dry desquamation noted to his left neck with erythema. Patient to follow up in 1 month for post treat. Weight is stable.   Betty Valles RN

## 2023-05-24 NOTE — TELEPHONE ENCOUNTER
Name: Hunter Sharp  : 1970  MRN: [id number]    Oncology Navigation Follow-Up Note    Contact Type:  Telephone    Notes: Dr. Padilla Bates notified last XRT scheduled  & CT neck scheduled . Dr. Padilla Bates requested CT neck rescheduled 4-6 weeks & f/u rescheduled after CT imaging. Spoke with pt & updated on need to reschedule appts. Pt stated out of town -. Pt requested imaging scheduled week of 7/10. Spoke w/[place], [facility] RO , updated on conversations w/Dr. Padilla Bates & pt. Inquired if dual MO/RO f/u's may be coordinated after CT neck completed. Gersno Obregon stated will reschedule CT neck & coordinate dual appt w/MO . Will continue to follow.     Electronically signed by Niya Grimm, RN on 2023 at 1:25 PM

## 2023-05-24 NOTE — PROGRESS NOTES
[facility]       Radiation Oncology          [address] [facility]          [address] [facility] [address]        Vesta Signs: [phone]        F: [phone]       [email or web address]             RADIATION ONCOLOGY WEEKLY PROGRESS NOTE  Patient ID:   Hunter Sharp  : 1970   MRN: [id number]    [facility]  [facility],   [address] [place] [address]., [place]   [phone]    DIAGNOSIS:  Cancer Staging   Squamous cell carcinoma of left tonsil ([facility] [address]  Staging form: Pharynx - HPV-Mediated Oropharynx, AJCC 8th Edition  - Clinical stage from 2023: Stage II (cT1, cN2, cM0, p16+) - Signed by Romaine Andrews MD on 2023      TREATMENT DETAILS:  Treatment Site: Head and Neck  Actual Dose: 5400 cGy  Total Planned Dose: 6000 cGy  Treatment Technique: IMRT  Fraction Technique: Daily  Therapy imaging monitoring: CBCT daily  Concurrent Chemotherapy: None    SUBJECTIVE:   Patient seen for their weekly on treatment evaluation today. Doing well and tolerating therapy as expected. He continues to tolerate oral diet and weight is relatively stable compared to last week. He reports loss of taste and xerostomia. No other complaints. OBJECTIVE:     ECO Asymptomatic    VITAL SIGNS: /83   Pulse 56   Resp 16   Wt 171 lb (77.6 kg)   BMI 26.78 kg/m²   Wt Readings from Last 5 Encounters:   23 171 lb (77.6 kg)   23 173 lb 9.6 oz (78.7 kg)   05/10/23 173 lb 6.4 oz (78.7 kg)   23 180 lb 12.8 oz (82 kg)   23 184 lb 6.4 oz (83.6 kg)     GENERAL:  General appearance is that of a well-nourished, well-developed in no apparent distress. HEENT: Mucositis in the oral cavity, small ulceration along the left posterior oral tongue. Grade 2 radiation dermatitis in the left mid neck. HEART:  Normal rate and regular rhythm  LUNGS:  Pulmonary effort normal.  ABDOMEN:  Soft, nontender, non distended  EXTREMITIES:  No edema. No calf tenderness.   MSK:  No

## 2023-05-25 ENCOUNTER — APPOINTMENT (OUTPATIENT)
Dept: RADIATION ONCOLOGY | Age: 53
End: 2023-05-25
Payer: COMMERCIAL

## 2023-05-25 ENCOUNTER — HOSPITAL ENCOUNTER (OUTPATIENT)
Dept: RADIATION ONCOLOGY | Age: 53
Discharge: HOME | End: 2023-05-25
Payer: COMMERCIAL

## 2023-05-25 PROCEDURE — 77386 HC NTSTY MODUL RAD TX DLVR CPLX: CPT | Performed by: RADIOLOGY

## 2023-05-26 ENCOUNTER — HOSPITAL ENCOUNTER (OUTPATIENT)
Dept: RADIATION ONCOLOGY | Age: 53
Discharge: HOME | End: 2023-05-26
Payer: COMMERCIAL

## 2023-05-26 ENCOUNTER — APPOINTMENT (OUTPATIENT)
Dept: RADIATION ONCOLOGY | Age: 53
End: 2023-05-26
Payer: COMMERCIAL

## 2023-05-26 PROCEDURE — 77386 HC NTSTY MODUL RAD TX DLVR CPLX: CPT | Performed by: RADIOLOGY

## 2023-05-30 ENCOUNTER — HOSPITAL ENCOUNTER (OUTPATIENT)
Dept: PHYSICAL THERAPY | Facility: CLINIC | Age: 53
Setting detail: THERAPIES SERIES
Discharge: HOME | End: 2023-05-30
Payer: COMMERCIAL

## 2023-05-30 ENCOUNTER — APPOINTMENT (OUTPATIENT)
Dept: RADIATION ONCOLOGY | Age: 53
End: 2023-05-30
Payer: COMMERCIAL

## 2023-05-30 PROCEDURE — 77386 HC NTSTY MODUL RAD TX DLVR CPLX: CPT | Performed by: RADIOLOGY

## 2023-05-30 PROCEDURE — 97110 THERAPEUTIC EXERCISES: CPT

## 2023-05-30 PROCEDURE — 97140 MANUAL THERAPY 1/> REGIONS: CPT

## 2023-05-30 NOTE — FLOWSHEET NOTE
[] [facility]) [facility] &  Therapy  [address]  [phone]  F: [phone] [] [address]  [facility]   [address]  P: [phone]  F: [phone] [x] [address]  [address]  P: [phone]  F: [phone] [] [address]  P: [phone]  F: [phone] [] [address]  [facility]   [address]   Tiffany Irvinon: [phone]  F: [phone]      Physical Therapy Daily Treatment Note    Date:  2023  Patient Name:  Alina Torres    :  1970  MRN: [id number]  Physician: Carol Ann Tidwell MD                                 Insurance: [employer] visit limit  Medical Diagnosis: Squamous cell carcinoma of left tonsil            Rehab Codes: C09.9, R29.3, G89.3, M54.2  Onset date: 23               Next Dr's appt.: ongoing  Visit# / total visits:     Cancels/No Shows: 0    Subjective: Pt completed radiation today, cont's w/ throat pain and hard time swallowing, states phlegm has gotten really thick preethi in the morning. Cont's to eat  and drink w/o issue despite everything having no taste. Pain:  [x] Yes  [] No Location: L shoulder/scapula, L and neck Pain Rating: (0-10 scale) 3-4/10  Pain altered Tx:  [x] No  [] Yes  Action:  Comments:    Objective:  Modalities:   Precautions: Squamous cell carcinoma of left tonsil, s/p tonsilectomy and LND 2+/38 @ [facility] 3/6/23. No chemo. 7 weeks radiation 23-23  Manual: cerv PROM/stretches, cerv down glides, side glides, manual traction, UT/levator stretch, SOR. MFR/CST SCM, scalenes, OCB. MLD and deep lymphatics (\"waddle,\" omohyoid, digastric and internal/external ear, sternum, pec and axillary) to H&N.  No-L GH mobs post, inf, PROM, partial PORi protocol

## 2023-06-02 ENCOUNTER — CLINICAL DOCUMENTATION (OUTPATIENT)
Dept: RADIATION ONCOLOGY | Age: 53
End: 2023-06-02

## 2023-06-02 NOTE — PROGRESS NOTES
[place] [address]            Radiation Oncology          [address] [facility]          [address]        Gissel Runner: [phone]        F: [phone]       [employer]           Dear  No ref. provider found: Thank you for referring Keerthi Padilla to me for evaluation and treatment. Below is a summary of the patient's recently completed radiation course. If you have questions, please do not hesitate to call me. I look forward to following this patient along with you. Sincerely,  Electronically signed by Emil Caal MD on 2023 at 8:40 AM EDT      CC: Patient Care Team:  Denver Pitt, MD as PCP - General (Family Medicine)  Ck Allred RN as Nurse Navigator (Oncology)  Damaso Jimenez RD, JO-ANN as Dietitian (Dietitian Registered)  ------------------------------------------------------------------------------------------------------------------------------------------------------------------------------------------        Date of Service: 2023     Location:  [facility] Radiation Oncology,   [address] [facility] [place]   [phone]        RADIATION ONCOLOGY END OF TREATMENT SUMMARY:    Patient ID:   Keerthi Padilla  : 1970   MRN: [id number]    DIAGNOSIS:  Cancer Staging   Squamous cell carcinoma of left tonsil ([facility] [address]  Staging form: Pharynx - HPV-Mediated Oropharynx, AJCC 8th Edition  - Clinical stage from 2023: Stage II (cT1, cN2, cM0, p16+) - Signed by Jerald Lara MD on 2023      TREATMENT DETAILS:  Treatment Site: Head and Neck  Delivered dose: 6000 cGy  Total Planned Dose: 6000 cGy  Treatment Technique: IMRT  Fraction Technique: Daily  Therapy imaging monitoring: CBCT daily  Concurrent Chemotherapy: None  Treatment dates: 2023 to 2023    CLINICAL COURSE:    Patient completed the treatment as prescribed and tolerated treatment as expected.   Patient reported fatigue, grade 2 radiation dermatitis of

## 2023-06-08 ENCOUNTER — HOSPITAL ENCOUNTER (OUTPATIENT)
Dept: PHYSICAL THERAPY | Facility: CLINIC | Age: 53
Setting detail: THERAPIES SERIES
Discharge: HOME | End: 2023-06-08
Payer: COMMERCIAL

## 2023-06-08 PROCEDURE — 97110 THERAPEUTIC EXERCISES: CPT

## 2023-06-08 PROCEDURE — 97140 MANUAL THERAPY 1/> REGIONS: CPT

## 2023-06-08 NOTE — FLOWSHEET NOTE
[] [facility]) [facility] &  Therapy  [address]  [phone]  F: [phone] [] [address]  [facility]   [address]  P: [phone]  F: [phone] [x] [address]  [address]  P: [phone]  F: [phone] [] [address]  P: [phone]  F: [phone] [] [address]  [facility]   [address]   [place]: [phone]  F: [phone]      Physical Therapy Daily Treatment Note    Date:  2023  Patient Name:  Barney Mccormick    :  1970  MRN: [id number]  Physician: Raz Hernandez MD                                 Insurance: [employer] visit limit  Medical Diagnosis: Squamous cell carcinoma of left tonsil            Rehab Codes: C09.9, R29.3, G89.3, M54.2  Onset date: 23               Next Dr's appt.: ongoing  Visit# / total visits:     Cancels/No Shows: 0    Subjective: Pt completed radiation last week, states he is feeling pretty good, felt more tired a few days. Reports throat is still dry, some pain in tongue and taste is metallic, notes appetite is improving and phlegm has gotten better. Notes a knot mid incision line, states it feels like a stitch. Pain:  [x] Yes  [] No Location: L shoulder/scapula, L and neck Pain Rating: (0-10 scale) 0-1/10 \"tight\"  Pain altered Tx:  [x] No  [] Yes  Action:  Comments: -23 will be OOT, 23 getting     Objective:  Modalities:   Precautions: Squamous cell carcinoma of left tonsil, s/p tonsilectomy and LND 2+/38 @ [facility] 3/6/23. No chemo. 7 weeks radiation 23-23  Manual: cerv PROM/stretches, cerv down glides, side glides, manual traction, UT/levator stretch, SOR. MFR/CST SCM, scalenes, OCB.  MLD and deep lymphatics

## 2023-06-22 ENCOUNTER — APPOINTMENT (OUTPATIENT)
Dept: PHYSICAL THERAPY | Facility: CLINIC | Age: 53
End: 2023-06-22
Payer: COMMERCIAL

## 2023-07-06 ENCOUNTER — TELEPHONE (OUTPATIENT)
Dept: ONCOLOGY | Age: 53
End: 2023-07-06

## 2023-07-06 ENCOUNTER — APPOINTMENT (OUTPATIENT)
Dept: RADIATION ONCOLOGY | Age: 53
End: 2023-07-06
Payer: COMMERCIAL

## 2023-07-06 NOTE — TELEPHONE ENCOUNTER
Name: Tariq Danielle  : 1970  MRN: [id number]    Oncology Navigation Follow-Up Note    Contact Type:  Telephone    Notes: LATE ENTRY: 2023 Pt called in to request Dr. Magnolia Garcia ([facility]) op note. Instructed pt may contact Dr. Magnolia Garcia office directly & request.  Pt verbalized understanding & denied further questions/concerns. Pt stated CT imaging & dual MO/RO f/u rescheduled r/t trip to [facility]. Encouraged pt to contact writer prn. Will continue to follow.      Electronically signed by Rock Gildardo RN on 2023 at 12:38 PM

## 2023-07-19 ENCOUNTER — HOSPITAL ENCOUNTER (OUTPATIENT)
Dept: CT IMAGING | Age: 53
Discharge: HOME | End: 2023-07-21
Attending: INTERNAL MEDICINE
Payer: COMMERCIAL

## 2023-07-19 DIAGNOSIS — C76.0 HEAD AND NECK CANCER (HCC): ICD-10-CM

## 2023-07-19 DIAGNOSIS — C09.9 SQUAMOUS CELL CARCINOMA OF LEFT TONSIL (HCC): Primary | ICD-10-CM

## 2023-07-19 DIAGNOSIS — C09.9 SQUAMOUS CELL CARCINOMA OF LEFT TONSIL (HCC): ICD-10-CM

## 2023-07-19 LAB
CREAT SERPL-MCNC: 1.1 MG/DL (ref 0.7–1.2)
GFR SERPL CREATININE-BSD FRML MDRD: >60 ML/MIN/1.73M2

## 2023-07-19 PROCEDURE — 82565 ASSAY OF CREATININE: CPT

## 2023-07-19 PROCEDURE — 70491 CT SOFT TISSUE NECK W/DYE: CPT

## 2023-07-19 PROCEDURE — 2580000003 HC RX 258: Performed by: INTERNAL MEDICINE

## 2023-07-19 PROCEDURE — 6360000004 HC RX CONTRAST MEDICATION: Performed by: INTERNAL MEDICINE

## 2023-07-19 PROCEDURE — 36415 COLL VENOUS BLD VENIPUNCTURE: CPT

## 2023-07-19 RX ORDER — 0.9 % SODIUM CHLORIDE 0.9 %
80 INTRAVENOUS SOLUTION INTRAVENOUS ONCE
Status: COMPLETED | OUTPATIENT
Start: 2023-07-19 | End: 2023-07-19

## 2023-07-19 RX ORDER — SODIUM CHLORIDE 0.9 % (FLUSH) 0.9 %
10 SYRINGE (ML) INJECTION PRN
Status: DISCONTINUED | OUTPATIENT
Start: 2023-07-19 | End: 2023-07-22 | Stop reason: HOSPADM

## 2023-07-19 RX ADMIN — SODIUM CHLORIDE, PRESERVATIVE FREE 10 ML: 5 INJECTION INTRAVENOUS at 13:35

## 2023-07-19 RX ADMIN — SODIUM CHLORIDE 80 ML: 9 INJECTION, SOLUTION INTRAVENOUS at 13:35

## 2023-07-19 RX ADMIN — IOPAMIDOL 75 ML: 755 INJECTION, SOLUTION INTRAVENOUS at 13:30

## 2023-08-02 ENCOUNTER — TELEPHONE (OUTPATIENT)
Dept: ONCOLOGY | Age: 53
End: 2023-08-02

## 2023-08-02 ENCOUNTER — HOSPITAL ENCOUNTER (OUTPATIENT)
Age: 53
Discharge: HOME | End: 2023-08-02

## 2023-08-02 ENCOUNTER — HOSPITAL ENCOUNTER (OUTPATIENT)
Dept: RADIATION ONCOLOGY | Age: 53
Discharge: HOME | End: 2023-08-02
Payer: COMMERCIAL

## 2023-08-02 ENCOUNTER — OFFICE VISIT (OUTPATIENT)
Dept: ONCOLOGY | Age: 53
End: 2023-08-02
Payer: COMMERCIAL

## 2023-08-02 VITALS
TEMPERATURE: 98 F | WEIGHT: 174 LBS | HEART RATE: 64 BPM | DIASTOLIC BLOOD PRESSURE: 69 MMHG | RESPIRATION RATE: 16 BRPM | SYSTOLIC BLOOD PRESSURE: 136 MMHG | BODY MASS INDEX: 27.25 KG/M2 | OXYGEN SATURATION: 97 %

## 2023-08-02 VITALS
RESPIRATION RATE: 16 BRPM | BODY MASS INDEX: 27.25 KG/M2 | TEMPERATURE: 98 F | HEART RATE: 64 BPM | WEIGHT: 174 LBS | DIASTOLIC BLOOD PRESSURE: 69 MMHG | SYSTOLIC BLOOD PRESSURE: 136 MMHG | OXYGEN SATURATION: 97 %

## 2023-08-02 DIAGNOSIS — N18.1 CKD (CHRONIC KIDNEY DISEASE), STAGE I: ICD-10-CM

## 2023-08-02 DIAGNOSIS — Z90.5 SINGLE KIDNEY: ICD-10-CM

## 2023-08-02 DIAGNOSIS — C09.9 SQUAMOUS CELL CARCINOMA OF LEFT TONSIL (HCC): Primary | ICD-10-CM

## 2023-08-02 DIAGNOSIS — A63.0 HPV (HUMAN PAPILLOMA VIRUS) ANOGENITAL INFECTION: ICD-10-CM

## 2023-08-02 LAB
ALBUMIN SERPL-MCNC: 4.7 G/DL (ref 3.5–5.2)
ALBUMIN/GLOB SERPL: 1.7 {RATIO} (ref 1–2.5)
ALP SERPL-CCNC: 64 U/L (ref 40–129)
ALT SERPL-CCNC: 16 U/L (ref 5–41)
ANION GAP SERPL CALCULATED.3IONS-SCNC: 10 MMOL/L (ref 9–17)
AST SERPL-CCNC: 16 U/L
BASOPHILS # BLD: 0 K/UL (ref 0–0.2)
BASOPHILS NFR BLD: 0 % (ref 0–2)
BILIRUB SERPL-MCNC: 0.3 MG/DL (ref 0.3–1.2)
BUN SERPL-MCNC: 17 MG/DL (ref 6–20)
CALCIUM SERPL-MCNC: 9.6 MG/DL (ref 8.6–10.4)
CHLORIDE SERPL-SCNC: 101 MMOL/L (ref 98–107)
CO2 SERPL-SCNC: 28 MMOL/L (ref 20–31)
CREAT SERPL-MCNC: 1.1 MG/DL (ref 0.7–1.2)
EOSINOPHIL # BLD: 0 K/UL (ref 0–0.4)
EOSINOPHILS RELATIVE PERCENT: 0 % (ref 1–4)
ERYTHROCYTE [DISTWIDTH] IN BLOOD BY AUTOMATED COUNT: 16.1 % (ref 12.5–15.4)
GFR SERPL CREATININE-BSD FRML MDRD: >60 ML/MIN/1.73M2
GLUCOSE SERPL-MCNC: 92 MG/DL (ref 70–99)
HCT VFR BLD AUTO: 43.9 % (ref 41–53)
HGB BLD-MCNC: 14.6 G/DL (ref 13.5–17.5)
LYMPHOCYTES NFR BLD: 0.59 K/UL (ref 1–4.8)
LYMPHOCYTES RELATIVE PERCENT: 10 % (ref 24–44)
MCH RBC QN AUTO: 29.5 PG (ref 26–34)
MCHC RBC AUTO-ENTMCNC: 33.2 G/DL (ref 31–37)
MCV RBC AUTO: 89 FL (ref 80–100)
MONOCYTES NFR BLD: 0.53 K/UL (ref 0.1–0.8)
MONOCYTES NFR BLD: 9 % (ref 1–7)
MORPHOLOGY: NORMAL
NEUTROPHILS NFR BLD: 81 % (ref 36–66)
NEUTS SEG NFR BLD: 4.78 K/UL (ref 1.8–7.7)
PLATELET # BLD AUTO: 224 K/UL (ref 140–450)
PMV BLD AUTO: 7.9 FL (ref 6–12)
POTASSIUM SERPL-SCNC: 4.9 MMOL/L (ref 3.7–5.3)
PROT SERPL-MCNC: 7.4 G/DL (ref 6.4–8.3)
RBC # BLD AUTO: 4.94 M/UL (ref 4.5–5.9)
SODIUM SERPL-SCNC: 139 MMOL/L (ref 135–144)
WBC OTHER # BLD: 5.9 K/UL (ref 3.5–11)

## 2023-08-02 PROCEDURE — 36415 COLL VENOUS BLD VENIPUNCTURE: CPT

## 2023-08-02 PROCEDURE — 99212 OFFICE O/P EST SF 10 MIN: CPT | Performed by: RADIOLOGY

## 2023-08-02 PROCEDURE — 99214 OFFICE O/P EST MOD 30 MIN: CPT | Performed by: INTERNAL MEDICINE

## 2023-08-02 PROCEDURE — 80053 COMPREHEN METABOLIC PANEL: CPT

## 2023-08-02 PROCEDURE — 85025 COMPLETE CBC W/AUTO DIFF WBC: CPT

## 2023-08-02 NOTE — TELEPHONE ENCOUNTER
Name: Melody Graves  : 1970  MRN: [id number]    Oncology Navigation Follow-Up Note    Contact Type:  Medical Oncology    Notes: Pt @ [facility] for dual MO/RO appts. Dr. Naresh Baeza alerted writer pt c/o sore throat & dyspnea @ night. Dr. Naresh Baeza requested assistance coordinating Dr. Ralph Desai, [facility], f/u. Pt inquired on lab draw Dr. Ralph Desai requested looking for HPV. Pt stated mobile phlebotomy requested Saturday appt to draw blood & unable to complete. Spoke w/Alivia, Dr. Kacie Mohan office, updated on pt & requested appt. Alivia stated will route message to Dr. Kacie Mohan nurse, Rosalina Leonardo, & request pt contacted to schedule appt. Pt updated & encouraged to complete [facility] lab draw. Pt stated will contact to set up. Instructed pt writer will follow closely to confirm Dr. Ralph Desai appt scheduled. Pt verbalized understanding & denied questions/concerns. Encouraged to contact writer prn. Dr. Naresh Jarquin updated. Melodie Liu., [facility] oncology navigation, updated on pt & requested  CT neck electronically sent to [facility]. Will continue to follow.       Electronically signed by Lucy Santana RN on 2023 at 11:33 AM

## 2023-08-02 NOTE — TELEPHONE ENCOUNTER
AVS from 8/2/23      RTC in 3 months visit Labs and scan/PET/CT    Rv sched for 11/15 @ 10:30 am     Pt was given AVS and appointment schedule    Electronically signed by Man Bartlett on 8/2/2023 at 11:17 AM

## 2023-08-02 NOTE — PROGRESS NOTES
Patient ID: Dorothy Tracey, 1970, [id number], 48 y.o. Referred by :  No ref. provider found   Reason for consultation: Left cervical lymph node      HISTORY OF PRESENT ILLNESS:    Oncologic History:    Dorothy Tracey is a very pleasant 48 y.o. male. 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 Congenital second branchial cleft cyst is in the differential   diagnosis only if the lesion has been present for years. Mild asymmetric enlargement of the left palatine tonsil without measurable   primary mass. PET/CT on February 8, 2023    1. Asymmetric radiotracer metabolically active focus in the left tonsillar   area attenuating the left vallecula, consistent with neoplasia. 2.  Cystic left neck mass is noted, photopenic centrally without FDG uptake. Walls are nodular and demonstrate areas of mild to moderate FDG uptake. Findings likely related to a necrotic metabolically active lymph node. Location adjacent to tonsillar metabolic lesion suspicious for metastasis. No other areas of metabolic activity noted within the cervical lymph nodes. 3.  Congenital absence of right kidney. Small calcification in the right   adrenal gland without metabolic activity. Patient underwent direct visualization by ENT 2/9/23       OPERATION PERFORMED:  1. Direct laryngoscopy with biopsies. 2. Biopsy of left oropharynx. SURGEON: Brayden Soriano MD.    ANESTHESIA: General endotracheal.    ESTIMATED BLOOD LOSS: Minimal.    OPERATIVE FINDINGS: He was noted to have a 6 cm mass in the left  upper cervical neck, consistent with a metastatic lymph node.  He  was

## 2023-08-07 ENCOUNTER — TELEPHONE (OUTPATIENT)
Dept: ONCOLOGY | Age: 53
End: 2023-08-07

## 2023-08-07 NOTE — TELEPHONE ENCOUNTER
Name: Destin Londono  : 1970  MRN: [id number]    Oncology Navigation Follow-Up Note    Contact Type:  Telephone    Notes: Dr. Lima Mcmillan [facility] nurse, left VM stating recent CT neck report reviewed & spoke w/pt who stated symptoms improving. Gilbert Lynne stated pt to complete 2023 PET/CT scan, will obtain imaging for review, & coordinate Dr. Aaliyah Klein f/u. Dr. Unice Scheuermann updated. Will continue to follow.     Electronically signed by Toan Melendez RN on 2023 at 2:01 PM

## 2023-09-14 ENCOUNTER — TELEPHONE (OUTPATIENT)
Dept: ONCOLOGY | Age: 53
End: 2023-09-14

## 2023-09-14 ENCOUNTER — HOSPITAL ENCOUNTER (OUTPATIENT)
Dept: NUCLEAR MEDICINE | Age: 53
Discharge: HOME | End: 2023-09-16
Attending: RADIOLOGY
Payer: COMMERCIAL

## 2023-09-14 DIAGNOSIS — C09.9 SQUAMOUS CELL CARCINOMA OF LEFT TONSIL (HCC): ICD-10-CM

## 2023-09-14 LAB — GLUCOSE BLD-MCNC: 89 MG/DL (ref 75–110)

## 2023-09-14 PROCEDURE — 78815 PET IMAGE W/CT SKULL-THIGH: CPT

## 2023-09-14 PROCEDURE — 3430000000 HC RX DIAGNOSTIC RADIOPHARMACEUTICAL: Performed by: RADIOLOGY

## 2023-09-14 PROCEDURE — A9552 F18 FDG: HCPCS | Performed by: RADIOLOGY

## 2023-09-14 PROCEDURE — 2580000003 HC RX 258: Performed by: RADIOLOGY

## 2023-09-14 PROCEDURE — 82947 ASSAY GLUCOSE BLOOD QUANT: CPT

## 2023-09-14 RX ORDER — SODIUM CHLORIDE 0.9 % (FLUSH) 0.9 %
10 SYRINGE (ML) INJECTION PRN
Status: DISCONTINUED | OUTPATIENT
Start: 2023-09-14 | End: 2023-09-17 | Stop reason: HOSPADM

## 2023-09-14 RX ORDER — FLUDEOXYGLUCOSE F 18 200 MCI/ML
10 INJECTION, SOLUTION INTRAVENOUS
Status: COMPLETED | OUTPATIENT
Start: 2023-09-14 | End: 2023-09-14

## 2023-09-14 RX ADMIN — FLUDEOXYGLUCOSE F 18 9.31 MILLICURIE: 200 INJECTION, SOLUTION INTRAVENOUS at 08:44

## 2023-09-14 RX ADMIN — SODIUM CHLORIDE, PRESERVATIVE FREE 10 ML: 5 INJECTION INTRAVENOUS at 08:44

## 2023-09-19 ENCOUNTER — TELEPHONE (OUTPATIENT)
Dept: ONCOLOGY | Age: 53
End: 2023-09-19

## 2023-09-19 ENCOUNTER — TELEPHONE (OUTPATIENT)
Dept: RADIATION ONCOLOGY | Age: 53
End: 2023-09-19

## 2023-09-19 DIAGNOSIS — C09.9 SQUAMOUS CELL CARCINOMA OF LEFT TONSIL (HCC): Primary | ICD-10-CM

## 2023-09-19 NOTE — TELEPHONE ENCOUNTER
Pt called stating he is still getting letters from his insurance carrier; [employer], stating that his radiation treatments and all doctor visit follow ups are not covered, due to Dr. Honey Coffman being \"out of network\". Pt is worried about coming to his appt this Friday 9/22/23, d/t this happening with his insurance. Pt provided a phone number [phone] to call ins company. Calling this number resulted in many transfers and placing on hold and disconnecting. Unable to get an answer. I emailed Opal Figueroa in [facility], to look into this matter and check to see if pts radiation treatments did receive prior-auth in March when pt started his treatments.

## 2023-09-19 NOTE — TELEPHONE ENCOUNTER
Name: Cristian Jennings  : 1970  MRN: [id number]    Oncology Navigation Follow-Up Note    Contact Type:  Telephone    Notes: Pt called in stating viewed PET/CT scan results & scheduled Dr. Faisal Prince f/u tomorrow to review. Pt verbalized concerns w/parts of PET/CT results. Inquired if pt scheduled Dr. Yaritza Clark f/u. Pt stated scheduled & unable to recall date. Stressed importance of Dr. Yaritza Clark f/u & scope to be performed @ f/u. Pt stated will update writer w/appt date after phone call complete. Encouraged pt to discuss PET/CT scan results concerns w/Dr. Faisal Prince. Pt verbalized understanding. Pt text writer stating Dr. Yaritza Clark appt \"did not go through\". Instructed pt to contact Dr. Kleber Powers office to schedule f/u. Pt verbalized understanding & denied further questions/concerns. Will continue to follow.     Electronically signed by Sven Petersen RN on 2023 at 8:27 AM

## 2023-09-19 NOTE — TELEPHONE ENCOUNTER
Name: Ted Kennedy  : 1970  MRN: [id number]    Oncology Navigation Follow-Up Note    Contact Type:  Telephone    Notes: Spoke w/Nohemi, Dr. Tom Renteria nurse, updated on pt & notified requested  [facility]CT [facility] stated will contact  to coordinate Dr. Enriquez Duty f/u. Will continue to follow.     Electronically signed by Tom Condon RN on 2023 at 10:30 AM

## 2023-09-20 ENCOUNTER — TELEPHONE (OUTPATIENT)
Dept: ONCOLOGY | Age: 53
End: 2023-09-20

## 2023-09-20 ENCOUNTER — TELEPHONE (OUTPATIENT)
Dept: RADIATION ONCOLOGY | Age: 53
End: 2023-09-20

## 2023-09-20 ENCOUNTER — HOSPITAL ENCOUNTER (OUTPATIENT)
Age: 53
Discharge: HOME | End: 2023-09-20
Payer: COMMERCIAL

## 2023-09-20 ENCOUNTER — OFFICE VISIT (OUTPATIENT)
Dept: ONCOLOGY | Age: 53
End: 2023-09-20
Payer: COMMERCIAL

## 2023-09-20 VITALS
SYSTOLIC BLOOD PRESSURE: 118 MMHG | TEMPERATURE: 98.4 F | RESPIRATION RATE: 18 BRPM | OXYGEN SATURATION: 98 % | DIASTOLIC BLOOD PRESSURE: 70 MMHG | WEIGHT: 179 LBS | HEART RATE: 58 BPM | BODY MASS INDEX: 28.04 KG/M2

## 2023-09-20 DIAGNOSIS — C09.9 TONSILLAR CANCER (HCC): Primary | ICD-10-CM

## 2023-09-20 DIAGNOSIS — K76.89 LIVER CYST: ICD-10-CM

## 2023-09-20 DIAGNOSIS — C09.9 SQUAMOUS CELL CARCINOMA OF LEFT TONSIL (HCC): ICD-10-CM

## 2023-09-20 DIAGNOSIS — N18.1 CKD (CHRONIC KIDNEY DISEASE), STAGE I: ICD-10-CM

## 2023-09-20 DIAGNOSIS — C09.9 SQUAMOUS CELL CARCINOMA OF LEFT TONSIL (HCC): Primary | ICD-10-CM

## 2023-09-20 LAB
ALBUMIN SERPL-MCNC: 4.6 G/DL (ref 3.5–5.2)
ALBUMIN/GLOB SERPL: 1.8 {RATIO} (ref 1–2.5)
ALP SERPL-CCNC: 74 U/L (ref 40–129)
ALT SERPL-CCNC: 17 U/L (ref 5–41)
ANION GAP SERPL CALCULATED.3IONS-SCNC: 9 MMOL/L (ref 9–17)
AST SERPL-CCNC: 16 U/L
BASOPHILS # BLD: 0.06 K/UL (ref 0–0.2)
BASOPHILS NFR BLD: 1 % (ref 0–2)
BILIRUB SERPL-MCNC: 0.3 MG/DL (ref 0.3–1.2)
BUN SERPL-MCNC: 16 MG/DL (ref 6–20)
CALCIUM SERPL-MCNC: 9.4 MG/DL (ref 8.6–10.4)
CHLORIDE SERPL-SCNC: 100 MMOL/L (ref 98–107)
CO2 SERPL-SCNC: 30 MMOL/L (ref 20–31)
CREAT SERPL-MCNC: 1.1 MG/DL (ref 0.7–1.2)
EOSINOPHIL # BLD: 0.06 K/UL (ref 0–0.4)
EOSINOPHILS RELATIVE PERCENT: 1 % (ref 1–4)
ERYTHROCYTE [DISTWIDTH] IN BLOOD BY AUTOMATED COUNT: 14.3 % (ref 12.5–15.4)
GFR SERPL CREATININE-BSD FRML MDRD: >60 ML/MIN/1.73M2
GLUCOSE SERPL-MCNC: 79 MG/DL (ref 70–99)
HCT VFR BLD AUTO: 41.7 % (ref 41–53)
HGB BLD-MCNC: 14 G/DL (ref 13.5–17.5)
LYMPHOCYTES NFR BLD: 0.51 K/UL (ref 1–4.8)
LYMPHOCYTES RELATIVE PERCENT: 9 % (ref 24–44)
MCH RBC QN AUTO: 30.3 PG (ref 26–34)
MCHC RBC AUTO-ENTMCNC: 33.5 G/DL (ref 31–37)
MCV RBC AUTO: 90.7 FL (ref 80–100)
MONOCYTES NFR BLD: 0.4 K/UL (ref 0.1–0.8)
MONOCYTES NFR BLD: 7 % (ref 1–7)
MORPHOLOGY: NORMAL
NEUTROPHILS NFR BLD: 82 % (ref 36–66)
NEUTS SEG NFR BLD: 4.67 K/UL (ref 1.8–7.7)
PLATELET # BLD AUTO: 235 K/UL (ref 140–450)
PMV BLD AUTO: 7.8 FL (ref 6–12)
POTASSIUM SERPL-SCNC: 4.2 MMOL/L (ref 3.7–5.3)
PROT SERPL-MCNC: 7.2 G/DL (ref 6.4–8.3)
RBC # BLD AUTO: 4.6 M/UL (ref 4.5–5.9)
SODIUM SERPL-SCNC: 139 MMOL/L (ref 135–144)
WBC OTHER # BLD: 5.7 K/UL (ref 3.5–11)

## 2023-09-20 PROCEDURE — 99214 OFFICE O/P EST MOD 30 MIN: CPT | Performed by: INTERNAL MEDICINE

## 2023-09-20 PROCEDURE — 80053 COMPREHEN METABOLIC PANEL: CPT

## 2023-09-20 PROCEDURE — 36415 COLL VENOUS BLD VENIPUNCTURE: CPT

## 2023-09-20 PROCEDURE — 99211 OFF/OP EST MAY X REQ PHY/QHP: CPT | Performed by: INTERNAL MEDICINE

## 2023-09-20 PROCEDURE — 85025 COMPLETE CBC W/AUTO DIFF WBC: CPT

## 2023-09-20 NOTE — TELEPHONE ENCOUNTER
Name: Roxi Lopez  : 1970  MRN: [id number]    Oncology Navigation Follow-Up Note    Contact Type:  Medical Oncology    Notes: Pt @ [facility] for Dr. Rene Dyer f/u. Met w/pt & pt's spouse prior to f/u. Pt stated scheduled for Dr. Michoacano Morales f/u . Pt denied questions/concerns for writer. Encouraged to contact writer prn. Will continue to follow.     Electronically signed by Javy Salgado RN on 2023 at 2:04 PM

## 2023-09-20 NOTE — TELEPHONE ENCOUNTER
AVS From 9/20/23      Rtc in 3 months with lab and u/s      RV scheduled for 12/20/23 @ 10:30am       Pt to schedule ultrasound    PT was given AVS and appt schedule    Electronically signed by Marcos Wilkes on 9/20/2023 at 2:20 PM

## 2023-09-20 NOTE — TELEPHONE ENCOUNTER
Pt presented to rad/onc , still trying to resolve issue with his insurance covering Dr. Wili Perera. Dr. Wili Perera came up and talked to the pt. Okay to cancel follow up for 9/22/23 and he will see pt after his next PET scan in 3 mo. Will order and schedule. Pt appreciative.

## 2023-09-21 ENCOUNTER — APPOINTMENT (OUTPATIENT)
Dept: RADIATION ONCOLOGY | Age: 53
End: 2023-09-21
Payer: COMMERCIAL

## 2023-09-22 ENCOUNTER — APPOINTMENT (OUTPATIENT)
Dept: RADIATION ONCOLOGY | Age: 53
End: 2023-09-22
Payer: COMMERCIAL

## 2023-09-23 NOTE — PROGRESS NOTES
Patient ID: Fred Palomares, 1970, [id number], 48 y.o. Referred by :  No ref. provider found   Reason for consultation: Left cervical lymph node      HISTORY OF PRESENT ILLNESS:    Oncologic History:    Fred Palomares is a very pleasant 48 y.o. male. 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 Congenital second branchial cleft cyst is in the differential   diagnosis only if the lesion has been present for years. Mild asymmetric enlargement of the left palatine tonsil without measurable   primary mass. PET/CT on February 8, 2023    1. Asymmetric radiotracer metabolically active focus in the left tonsillar   area attenuating the left vallecula, consistent with neoplasia. 2.  Cystic left neck mass is noted, photopenic centrally without FDG uptake. Walls are nodular and demonstrate areas of mild to moderate FDG uptake. Findings likely related to a necrotic metabolically active lymph node. Location adjacent to tonsillar metabolic lesion suspicious for metastasis. No other areas of metabolic activity noted within the cervical lymph nodes. 3.  Congenital absence of right kidney. Small calcification in the right   adrenal gland without metabolic activity. Patient underwent direct visualization by ENT 2/9/23       OPERATION PERFORMED:  1. Direct laryngoscopy with biopsies. 2. Biopsy of left oropharynx. SURGEON: Ольга Galo MD.    ANESTHESIA: General endotracheal.    ESTIMATED BLOOD LOSS: Minimal.    OPERATIVE FINDINGS: He was noted to have a 6 cm mass in the left  upper cervical neck, consistent with a metastatic lymph node.  He  was

## 2023-09-27 ENCOUNTER — TELEPHONE (OUTPATIENT)
Dept: ONCOLOGY | Age: 53
End: 2023-09-27

## 2023-09-27 NOTE — TELEPHONE ENCOUNTER
Name: Patricia Castillo  : 1970  MRN: [id number]    Oncology Navigation Follow-Up Note    Contact Type:  Telephone    Notes: Pt called in stating completed Dr. Sunni Wagner  f/u. Per pt scope completed & no evidence of recurrence. Instructed pt writer will mail SCP & requested contact writer once received to complete survivorship visit. Pt verbalized understanding & denied questions/concerns. Encouraged to contact writer prn. Will continue to follow.      Electronically signed by Qian Kaplan RN on 2023 at 11:38 AM

## 2023-10-10 ENCOUNTER — HOSPITAL ENCOUNTER (OUTPATIENT)
Dept: ULTRASOUND IMAGING | Age: 53
Discharge: HOME | End: 2023-10-12
Payer: COMMERCIAL

## 2023-10-10 DIAGNOSIS — K76.89 LIVER CYST: ICD-10-CM

## 2023-10-10 PROCEDURE — 76705 ECHO EXAM OF ABDOMEN: CPT

## 2023-10-12 ENCOUNTER — TELEPHONE (OUTPATIENT)
Dept: ONCOLOGY | Age: 53
End: 2023-10-12

## 2023-10-12 NOTE — TELEPHONE ENCOUNTER
Name: Darlin Lucero  : 1970  MRN: [id number]    Oncology Navigation Follow-Up Note    Contact Type:  Telephone    Notes: Spoke w/pt to inquire if received SCP. Pt stated yes & requested to call writer back to review. Will continue to follow.     Electronically signed by Kirby Lentz RN on 10/12/2023 at 1:06 PM

## 2023-10-26 ENCOUNTER — TELEPHONE (OUTPATIENT)
Dept: ONCOLOGY | Age: 53
End: 2023-10-26

## 2023-10-26 NOTE — TELEPHONE ENCOUNTER
Name: Salma Finch  : 1970  MRN: [id number]    Oncology Navigation Follow-Up Note    Contact Type:  Telephone    Notes: Attempted to contact pt to review SCP, no answer, VM left encouraged pt to contact writer to review SCP, notified navigation ended, & instructed may contact writer prn.     Electronically signed by Marcos Cantor RN on 10/26/2023 at 2:32 PM

## 2023-11-06 ENCOUNTER — TELEPHONE (OUTPATIENT)
Dept: ONCOLOGY | Age: 53
End: 2023-11-06

## 2023-11-06 NOTE — TELEPHONE ENCOUNTER
Name: Dontae Roper  : 1970  MRN: [id number]    Oncology Navigation Follow-Up Note    Contact Type:  Telephone    Notes: Pt called back stating ready to review SCP. Pt c/o continued dry mouth & jaw pain w/repositioning. SCP reviewed w/pt. Offered referral to [facility] oncology rehab, pt declined @ this time. Pt confirmed  PET/CT scan &  RO/MO f/u. Instructed pt navigation ended & may contact writer prn. Revised SCP mailed to pt. Cancer Treatment Summary and  Survivorship Care Plan    Provided by Brunilda Montiel RN on 23        This Cancer Treatment Summary is a brief record of your cancer treatment. The forms provide a way for a cancer survivor to review and retain information about their cancer, cancer treatment, and follow-up care. The forms are also meant to provide basic information about a survivor's care to future healthcare providers. General Information   Patient name Dontae Roper    Patient ID [id number]    Phone [phone] (home)    Date of birth/Age 1970, 48 y.o. Health Care Team and Follow-Ups    Continue all standard non-cancer related health care with your Primary Care Provider. Any new, unusual and/or persistent symptoms should be brought to the attention of your provider. Primary Care Physician Michele Arredondo MD,     Coordinating Provider When / How Often? Medical Oncology visits Dr. Murphy Marcum.  [address]  Phone: [phone]  Fax: [phone]  Year 1: every 1-3 months  Year 2: every 2-6 months  Year 3-5: every 4-8 months  >5 years: annually   per NCCN Guidelines    Next appt: 23   Radiation Oncology visits Dr. Marixa Mckeon/Dr. Cami Max  [address] [place] [address]  Phone: [phone]  Year 1: every 1-3 months  Year 2: every 2-6 months  Year 3-5: every 4-8 months  >5 years: annually   per NCCN Guidelines    Next appt: 23     General Surgeon visits Dr. Andie Khan.

## 2023-12-05 ENCOUNTER — CLINICAL DOCUMENTATION (OUTPATIENT)
Dept: PHYSICAL THERAPY | Facility: CLINIC | Age: 53
End: 2023-12-05

## 2023-12-05 NOTE — DISCHARGE SUMMARY
[] [facility]  Outpatient Rehabilitation &  Therapy  [address]  [phone]  F: [phone] [] [facility]  Outpatient Rehabilitation &  Therapy  [address]   [address]  P: [phone]  F: [phone] [] [facility]  Outpatient Rehabilitation &  Therapy  [address]  P: [phone]  F: [phone] [] [facility]  Outpatient Rehabilitation &  Therapy  [address]  P: [phone]  F: [phone] [] [facility]  Outpatient Rehabilitation &  Therapy  [address]   P: [phone]  F: [phone]  [] [facility]  Outpatient Rehabilitation &  Therapy  [address]   P: [phone]  F: [phone] [] [facility]  Outpatient Rehabilitation &  Therapy  [address]  [address]  P: [phone]  F: [phone] [] [facility]  Outpatient Rehabilitation &  Therapy  [address]  P: [phone]  F: [phone] [] [facility]  Outpatient Rehabilitation &  Therapy  [address]  P: [phone]  F: [phone]      Physical Therapy Discharge Note    Date: 2023      Patient: Lyle Ordonez  : 1970  MRN: [id number]    Physician: Stephen Mckeon MD                                 Insurance: [employer] visit limit  Medical Diagnosis: Squamous cell carcinoma of left tonsil            Rehab Codes: C09.9, R29.3, G89.3, M54.2  Onset date: 23               Next 's appt.: ongoing    Total visits attended:9  Cancels/No shows:0  Date of initial visit: 23                Date of final visit: 23      Subjective:Pt arrives stating he's been feeling pretty good. Generally fatigued by midday. Appetite cont to improve, no change in taste and dryness of the throat. Cont to report feeling a \"stitch\" near the incision line and is considering

## 2023-12-11 ENCOUNTER — TELEPHONE (OUTPATIENT)
Dept: RADIATION ONCOLOGY | Age: 53
End: 2023-12-11

## 2023-12-11 NOTE — TELEPHONE ENCOUNTER
Patient scheduled with Dr. Jazlyn Ryan for follow up on 12/27 due to Dr. Lashonda Bright off. Patient would like to r/s due to MD being off. Patient states he is still battling with insurance for bills with Dr. Jazlyn Ryan see previous encounters for updates. I provided patient with billing numbers to follow up.

## 2023-12-20 ENCOUNTER — HOSPITAL ENCOUNTER (OUTPATIENT)
Age: 53
Discharge: HOME | End: 2023-12-20
Payer: COMMERCIAL

## 2023-12-20 DIAGNOSIS — K76.89 LIVER CYST: ICD-10-CM

## 2023-12-20 DIAGNOSIS — C09.9 SQUAMOUS CELL CARCINOMA OF LEFT TONSIL (HCC): ICD-10-CM

## 2023-12-20 LAB
ALBUMIN SERPL-MCNC: 4.6 G/DL (ref 3.5–5.2)
ALBUMIN/GLOB SERPL: 1.7 {RATIO} (ref 1–2.5)
ALP SERPL-CCNC: 60 U/L (ref 40–129)
ALT SERPL-CCNC: 18 U/L (ref 5–41)
ANION GAP SERPL CALCULATED.3IONS-SCNC: 8 MMOL/L (ref 9–17)
AST SERPL-CCNC: 18 U/L
BASOPHILS # BLD: 0.02 K/UL (ref 0–0.2)
BASOPHILS NFR BLD: 0 % (ref 0–2)
BILIRUB SERPL-MCNC: 0.3 MG/DL (ref 0.3–1.2)
BUN SERPL-MCNC: 14 MG/DL (ref 6–20)
CALCIUM SERPL-MCNC: 9.5 MG/DL (ref 8.6–10.4)
CHLORIDE SERPL-SCNC: 103 MMOL/L (ref 98–107)
CO2 SERPL-SCNC: 31 MMOL/L (ref 20–31)
CREAT SERPL-MCNC: 1.1 MG/DL (ref 0.7–1.2)
EOSINOPHIL # BLD: 0.05 K/UL (ref 0–0.4)
EOSINOPHILS RELATIVE PERCENT: 1 % (ref 1–4)
ERYTHROCYTE [DISTWIDTH] IN BLOOD BY AUTOMATED COUNT: 14.5 % (ref 12.5–15.4)
GFR SERPL CREATININE-BSD FRML MDRD: >60 ML/MIN/1.73M2
GLUCOSE SERPL-MCNC: 95 MG/DL (ref 70–99)
HCT VFR BLD AUTO: 45.2 % (ref 41–53)
HGB BLD-MCNC: 14.9 G/DL (ref 13.5–17.5)
LYMPHOCYTES NFR BLD: 0.52 K/UL (ref 1–4.8)
LYMPHOCYTES RELATIVE PERCENT: 11 % (ref 24–44)
MCH RBC QN AUTO: 30 PG (ref 26–34)
MCHC RBC AUTO-ENTMCNC: 33 G/DL (ref 31–37)
MCV RBC AUTO: 91.1 FL (ref 80–100)
MONOCYTES NFR BLD: 0.43 K/UL (ref 0.1–1.2)
MONOCYTES NFR BLD: 9 % (ref 2–11)
NEUTROPHILS NFR BLD: 79 % (ref 36–66)
NEUTS SEG NFR BLD: 3.68 K/UL (ref 1.8–7.7)
PLATELET # BLD AUTO: 217 K/UL (ref 140–450)
PMV BLD AUTO: 10.3 FL (ref 8–14)
POTASSIUM SERPL-SCNC: 5.1 MMOL/L (ref 3.7–5.3)
PROT SERPL-MCNC: 7.3 G/DL (ref 6.4–8.3)
RBC # BLD AUTO: 4.96 M/UL (ref 4.5–5.9)
SODIUM SERPL-SCNC: 142 MMOL/L (ref 135–144)
WBC OTHER # BLD: 4.7 K/UL (ref 3.5–11)

## 2023-12-20 PROCEDURE — 80053 COMPREHEN METABOLIC PANEL: CPT

## 2023-12-20 PROCEDURE — 85025 COMPLETE CBC W/AUTO DIFF WBC: CPT

## 2023-12-20 PROCEDURE — 36415 COLL VENOUS BLD VENIPUNCTURE: CPT

## 2023-12-22 ENCOUNTER — APPOINTMENT (OUTPATIENT)
Dept: RADIATION ONCOLOGY | Age: 53
End: 2023-12-22
Payer: COMMERCIAL

## 2023-12-27 ENCOUNTER — APPOINTMENT (OUTPATIENT)
Dept: RADIATION ONCOLOGY | Age: 53
End: 2023-12-27
Payer: COMMERCIAL

## 2023-12-27 ENCOUNTER — HOSPITAL ENCOUNTER (OUTPATIENT)
Age: 53
Discharge: HOME | End: 2023-12-27
Payer: COMMERCIAL

## 2023-12-27 ENCOUNTER — OFFICE VISIT (OUTPATIENT)
Dept: ONCOLOGY | Age: 53
End: 2023-12-27
Payer: COMMERCIAL

## 2023-12-27 VITALS
DIASTOLIC BLOOD PRESSURE: 83 MMHG | RESPIRATION RATE: 18 BRPM | HEART RATE: 50 BPM | OXYGEN SATURATION: 98 % | WEIGHT: 190.8 LBS | BODY MASS INDEX: 29.88 KG/M2 | SYSTOLIC BLOOD PRESSURE: 136 MMHG | TEMPERATURE: 96.8 F

## 2023-12-27 DIAGNOSIS — C09.9 SQUAMOUS CELL CARCINOMA OF LEFT TONSIL (HCC): Primary | ICD-10-CM

## 2023-12-27 DIAGNOSIS — R93.89 ABNORMAL CT OF THE CHEST: ICD-10-CM

## 2023-12-27 DIAGNOSIS — R53.83 OTHER FATIGUE: ICD-10-CM

## 2023-12-27 DIAGNOSIS — C09.9 SQUAMOUS CELL CARCINOMA OF LEFT TONSIL (HCC): ICD-10-CM

## 2023-12-27 LAB
CORTIS SERPL-MCNC: 7.6 UG/DL (ref 2.7–18.4)
CORTISOL COLLECTION INFO: NORMAL
SHBG SERPL-SCNC: 25 NMOL/L (ref 11–80)
TESTOST FREE MFR SERPL: 133.8 PG/ML (ref 47–244)
TESTOST SERPL-MCNC: 542 NG/DL (ref 220–1000)
TESTOSTERONE, BIOAVAILABLE: 313.5 NG/DL (ref 130–680)
TSH SERPL DL<=0.05 MIU/L-ACNC: 3.27 UIU/ML (ref 0.3–5)

## 2023-12-27 PROCEDURE — 84270 ASSAY OF SEX HORMONE GLOBUL: CPT

## 2023-12-27 PROCEDURE — 84443 ASSAY THYROID STIM HORMONE: CPT

## 2023-12-27 PROCEDURE — 99211 OFF/OP EST MAY X REQ PHY/QHP: CPT | Performed by: INTERNAL MEDICINE

## 2023-12-27 PROCEDURE — 36415 COLL VENOUS BLD VENIPUNCTURE: CPT

## 2023-12-27 PROCEDURE — 82533 TOTAL CORTISOL: CPT

## 2023-12-27 PROCEDURE — 84403 ASSAY OF TOTAL TESTOSTERONE: CPT

## 2023-12-27 PROCEDURE — 99214 OFFICE O/P EST MOD 30 MIN: CPT | Performed by: INTERNAL MEDICINE

## 2023-12-27 RX ORDER — CLINDAMYCIN PHOSPHATE 11.9 MG/ML
SOLUTION TOPICAL
COMMUNITY
Start: 2023-11-03

## 2023-12-27 NOTE — PROGRESS NOTES
right   adrenal gland without metabolic activity. Patient underwent direct visualization by ENT 2/9/23       OPERATION PERFORMED:  1. Direct laryngoscopy with biopsies. 2. Biopsy of left oropharynx. SURGEON: Honey Rodriguez MD.    ANESTHESIA: General endotracheal.    ESTIMATED BLOOD LOSS: Minimal.    OPERATIVE FINDINGS: He was noted to have a 6 cm mass in the left  upper cervical neck, consistent with a metastatic lymph node. He  was also noted to have a firm mass within the left tonsil. No  other lesions were seen. Biopsies were taken of the tonsil and  the vallecula. Pathology    Final Pathologic Diagnosis   1. Left tonsil, biopsy:     Squamous cell carcinoma, HPV associated. Comment: Immunostains were performed to help evaluate the carcinoma and P40 and P16 are diffuse strong positive, supportive of HPV-associated squamous cell carcinoma. All controls are adequate. 2.  Left vallecula, biopsy:   Benign squamous mucosa with focal chronic inflammation and reactive changes. Interval history  During this visit patient's allergy, social, medical, surgical history and medications were reviewed and updated.     Recovering from surgery  Discussed at the tumor board and with the pathology at [address] [place] [address] there is no high risk factors to this for HPV which indicate adjuvant chemotherapy    Interval history  Did complain of sore throat postnasal drips  Follow-up scan of the significant soft tissue no evidence of recurrence  PET/CT was reviewed and the activity in the left tonsil back to normal  Essentially back negative   Describes fatigue  PET/CT granulomatous disease        Past Medical History:   Diagnosis Date    Head and neck cancer ([address])     Herpes     Solitary kidney, congenital        Past Surgical History:   Procedure Laterality Date    ACHILLES TENDON SURGERY Right     OTHER SURGICAL HISTORY  03/06/2023    direct laryngoscopy, transoral robotic approach for a left radical

## 2024-01-03 ENCOUNTER — HOSPITAL ENCOUNTER (OUTPATIENT)
Dept: RADIATION ONCOLOGY | Age: 54
Discharge: HOME | End: 2024-01-03
Payer: COMMERCIAL

## 2024-01-03 VITALS
SYSTOLIC BLOOD PRESSURE: 138 MMHG | DIASTOLIC BLOOD PRESSURE: 88 MMHG | BODY MASS INDEX: 29.66 KG/M2 | HEART RATE: 60 BPM | WEIGHT: 189.4 LBS | RESPIRATION RATE: 16 BRPM | TEMPERATURE: 98.1 F

## 2024-01-03 PROCEDURE — 99214 OFFICE O/P EST MOD 30 MIN: CPT | Performed by: RADIOLOGY

## 2024-01-03 PROCEDURE — 99212 OFFICE O/P EST SF 10 MIN: CPT | Performed by: RADIOLOGY

## 2024-01-03 ASSESSMENT — PAIN SCALES - GENERAL: PAINLEVEL_OUTOF10: 0

## 2024-01-03 NOTE — PROGRESS NOTES
[facility]            Radiation Oncology          [address] [place] [address]        O: [phone]        F: [phone]       [email or web address]           Date of Service: 1/3/2024     Location:  [facility] Radiation Oncology,   [address] [place] [id number]   [phone]       RADIATION ONCOLOGY FOLLOW UP NOTE    Patient ID:   Lyle Ordonez  : 1970   MRN: [id number]    DIAGNOSIS:  Cancer Staging   Squamous cell carcinoma of left tonsil (HCC)  Staging form: Pharynx - HPV-Mediated Oropharynx, AJCC 8th Edition  - Clinical stage from 2023: Stage II (cT1, cN2, cM0, p16+) - Signed by Stephen Mckeon MD on 2023      -s/p tonsilectomy and LND @ [facility]/6/23   -s/p adj RT H&N 60Gy 23    INTERVAL HISTORY:   Lyle Ordonez is a 53 y.o.. male with diagnosis of left sided tonsillar squamous cell carcinoma p16 positive status post surgical resection at [facility] followed by adjuvant radiation therapy to a dose of 6000 cGy completed on 2023.  Patient presents for a posttreatment follow-up today approximately 7 months after finishing.  He notes his xerostomia is improving though still requires water.  He notes his taste is returned mostly to baseline though still has occasional issues.  He does note some tightness in the neck and numbness along his incision.  He also has some tightness in his jaw on range of motion though is able to eat regular foods.  He does have some trouble with breads.  He did have a recent PET scan on 2023 which showed no evidence of any abnormal uptake.  He did meet with his medical oncologist and had his TSH measured on 2023 which was 3.27.  He is due to see his ENT at [facility] later this week who will examine him and be following circulating tumor DNA.  Patient is using his fluoride paste though occasionally forgets.    MEDICATIONS:    Current Outpatient 
Lyle Ordonez  1/3/2024  9:37 AM      Vitals:    01/03/24 0930   BP: 138/88   Pulse: 60   Resp: 16   Temp: 98.1 °F (36.7 °C)    :        Pain Level: 0       Wt Readings from Last 1 Encounters:   01/03/24 85.9 kg (189 lb 6.4 oz)                Current Outpatient Medications:     clindamycin (CLEOCIN T) 1 % external solution, , Disp: , Rfl:     doxycycline hyclate (VIBRAMYCIN) 100 MG capsule, Take 1 capsule by mouth daily (Patient not taking: Reported on 4/19/2023), Disp: , Rfl:     acetaminophen (TYLENOL) 325 MG tablet, Take 2 tablets by mouth every 6 hours as needed for Pain, Disp: , Rfl:     tamsulosin (FLOMAX) 0.4 MG capsule, Take 2 capsules by mouth daily. (Patient not taking: Reported on 2/3/2023), Disp: 60 capsule, Rfl: 5               FALLS RISK SCREEN  Instructions:  Assess the patient and enter the appropriate indicators that are present for fall risk identification.   Total the numbers entered and assign a fall risk score from Table 2.  Reassess patient at a minimum every 12 weeks or with status change.    Assessment   Date  1/3/2024     1.  Mental Ability: confusion/cognitively impaired 0     2.  Elimination Issues: incontinence, frequency 0       3.  Ambulatory: use of assistive devices (walker, cane, off-loading devices),        attached to equipment (IV pole, oxygen) 0     4.  Sensory Limitations: dizziness, vertigo, impaired vision 0     5.  Age less than 65        0     6.  Age 65 or greater 0     7.  Medication: diuretics, strong analgesics, hypnotics, sedatives,        antihypertensive agents 0   8.  Falls:  recent history of falls within the last 3 months (not to include slipping or        tripping) 0   TOTAL 0               TABLE 2   Risk Score Risk Level Plan of Care   0-3 Little or  No Risk 1.  Provide assistance as indicated for ambulation activities  2.  Reorient confused/cognitively impaired patient  3.  Chair/bed in low position, stretcher/bed with siderails up except when performing 
S/P ovarian cystectomy  bilateral, years ago

## 2024-02-06 ENCOUNTER — TELEPHONE (OUTPATIENT)
Dept: RADIATION ONCOLOGY | Age: 54
End: 2024-02-06

## 2024-02-06 NOTE — TELEPHONE ENCOUNTER
Pt left voicemail stating he is still getting denied bills from his ins co, [employer], stating Dr. Bonilla is out of network.   I called [employer] provider line and they verified Dr. Bonilla is a covered provider. I explained the bills pt received, they stated for pt to call with claim number and have the claim resubmitted, as provider is covered. Ref #[id number]  I called pt and advised.

## 2024-02-16 ENCOUNTER — HOSPITAL ENCOUNTER (OUTPATIENT)
Dept: CT IMAGING | Age: 54
Discharge: HOME | End: 2024-02-16
Attending: INTERNAL MEDICINE
Payer: COMMERCIAL

## 2024-02-16 DIAGNOSIS — C09.9 SQUAMOUS CELL CARCINOMA OF LEFT TONSIL (HCC): ICD-10-CM

## 2024-02-16 PROCEDURE — 71250 CT THORAX DX C-: CPT

## 2024-03-04 DIAGNOSIS — R93.89 ABNORMAL CT OF THE CHEST: ICD-10-CM

## 2024-03-04 DIAGNOSIS — C09.9 SQUAMOUS CELL CARCINOMA OF LEFT TONSIL (HCC): Primary | ICD-10-CM

## 2024-03-04 DIAGNOSIS — R53.83 OTHER FATIGUE: ICD-10-CM

## 2024-03-12 ENCOUNTER — HOSPITAL ENCOUNTER (OUTPATIENT)
Age: 54
Discharge: HOME | End: 2024-03-12
Payer: COMMERCIAL

## 2024-03-12 ENCOUNTER — CLINICAL DOCUMENTATION (OUTPATIENT)
Dept: OCCUPATIONAL THERAPY | Age: 54
End: 2024-03-12

## 2024-03-12 DIAGNOSIS — R93.89 ABNORMAL CT OF THE CHEST: ICD-10-CM

## 2024-03-12 DIAGNOSIS — C09.9 SQUAMOUS CELL CARCINOMA OF LEFT TONSIL (HCC): ICD-10-CM

## 2024-03-12 DIAGNOSIS — R53.83 OTHER FATIGUE: ICD-10-CM

## 2024-03-12 LAB
TESTOST SERPL-MCNC: 515 NG/DL (ref 193–740)
TSH SERPL DL<=0.05 MIU/L-ACNC: 2.88 UIU/ML (ref 0.3–5)

## 2024-03-12 PROCEDURE — 36415 COLL VENOUS BLD VENIPUNCTURE: CPT

## 2024-03-12 PROCEDURE — 84403 ASSAY OF TOTAL TESTOSTERONE: CPT

## 2024-03-12 PROCEDURE — 84443 ASSAY THYROID STIM HORMONE: CPT

## 2024-03-12 NOTE — DISCHARGE SUMMARY
maximum benefit. No further therapy indicated at this time.  [x] Pt to continue exercise/home instructions independently  [x] Pt has not called or returned to clinic in over 90 days with additional concerns.   Comments:      Brief Fatigue Inventory:   [] Eval: 2 - mild   [x] D/C: unable to collect                  Electronically signed by Jael Segura OT on 3/12/2024 at 11:40 AM    The patient is being discharged due to the status listed above. If patient would like to return to the clinic for additional therapy a new referral will be necessary. Thank you again for your referral and allowing us to assist in the care of this patient!      For any questions or concerns, please don't hesitate to call us at [phone].

## 2024-03-13 ENCOUNTER — OFFICE VISIT (OUTPATIENT)
Dept: ONCOLOGY | Age: 54
End: 2024-03-13
Payer: COMMERCIAL

## 2024-03-13 ENCOUNTER — TELEPHONE (OUTPATIENT)
Dept: ONCOLOGY | Age: 54
End: 2024-03-13

## 2024-03-13 VITALS
DIASTOLIC BLOOD PRESSURE: 83 MMHG | TEMPERATURE: 97.4 F | BODY MASS INDEX: 29.38 KG/M2 | HEART RATE: 46 BPM | OXYGEN SATURATION: 99 % | RESPIRATION RATE: 18 BRPM | SYSTOLIC BLOOD PRESSURE: 132 MMHG | WEIGHT: 187.6 LBS

## 2024-03-13 DIAGNOSIS — C09.9 SQUAMOUS CELL CARCINOMA OF LEFT TONSIL (HCC): Primary | ICD-10-CM

## 2024-03-13 DIAGNOSIS — R53.83 OTHER FATIGUE: ICD-10-CM

## 2024-03-13 DIAGNOSIS — I51.7 CARDIOMEGALY: ICD-10-CM

## 2024-03-13 DIAGNOSIS — I51.5 CARDIAC CALCIFICATION (HCC): ICD-10-CM

## 2024-03-13 PROCEDURE — 99211 OFF/OP EST MAY X REQ PHY/QHP: CPT | Performed by: INTERNAL MEDICINE

## 2024-03-13 PROCEDURE — 99214 OFFICE O/P EST MOD 30 MIN: CPT | Performed by: INTERNAL MEDICINE

## 2024-03-13 NOTE — PROGRESS NOTES
the cancer in more than 1 lymph node metastasis status post radiation treatment and no chemotherapy given  Posttreatment PET/CT no activity> granulomatous changes> follow-up CT of the chest in 3 months no lesions  Cardiac calcification and cardiomegaly> referred to cardiology  Fatigue> TSH/free T4/testosterone> in the normal limit  Follow-up with ENT and radiation oncology  RTC in 6 months               Patient's conditions were taken into consideration when deciding risk-benefit for therapy.  Patient is at high risk for drug interaction risk of complications.  Given multiple comorbid conditions patient is at high risk for complication from intervention, risk of hospitalization, medical interaction overall life expectancy.  NCCN guidelines were reviewed and discussed with the patient.  The diagnosis and care plan were discussed with the patient in detail. I discussed the natural history of the disease, prognosis, risks and goals of therapy and answered all the patients questions to the best of my ability.  Patient expressed understanding and was in agreement.                                      Caroline Bartlett MD                          [facility]                            This note is created with the assistance of a speech recognition program.  While intending to generate a document that actually reflects the content of the visit, the document can still have some errors including those of syntax and sound a like substitutions which may escape proof reading.  It such instances, actual meaning can be extrapolated by contextual diversion.

## 2024-03-13 NOTE — TELEPHONE ENCOUNTER
Rtc in 6 months   Referral to cardiology at [place](Sid Thompson    Rv scheduled for 9/11/24 @ 8:00am     Referral to [place] [facility] faxed @ [phone]    PT was given AVS and appt schedule    Electronically signed by Breanne Blanco on 3/13/2024 at 8:39 AM

## 2024-06-13 NOTE — TELEPHONE ENCOUNTER
Carla GREENBERG, RN Navigator. Per [facility] request I called and left message for Chris Hill to push PET Images to [facility].  Once PET report is complete, I will fax report to Dr. Abhay Fregoso at [facility]. Is This A New Presentation, Or A Follow-Up?: Follow Up Isotretinoin Additional History: C/O red eyes and stomach ache

## 2024-07-25 ENCOUNTER — HOSPITAL ENCOUNTER (OUTPATIENT)
Dept: RADIATION ONCOLOGY | Age: 54
Discharge: HOME | End: 2024-07-25
Payer: COMMERCIAL

## 2024-07-25 VITALS
SYSTOLIC BLOOD PRESSURE: 129 MMHG | TEMPERATURE: 97.6 F | BODY MASS INDEX: 28.57 KG/M2 | OXYGEN SATURATION: 97 % | RESPIRATION RATE: 16 BRPM | HEART RATE: 60 BPM | WEIGHT: 182.4 LBS | DIASTOLIC BLOOD PRESSURE: 74 MMHG

## 2024-07-25 PROCEDURE — 99212 OFFICE O/P EST SF 10 MIN: CPT | Performed by: RADIOLOGY

## 2024-07-25 NOTE — PROGRESS NOTES
Lyle Ordonez  7/25/2024  9:02 AM      Vitals:    07/25/24 0840   BP: 129/74   Pulse: 60   Resp: 16   Temp: 97.6 °F (36.4 °C)   SpO2: 97%    :     Pain Assessment: None - Denies Pain          Wt Readings from Last 1 Encounters:   07/25/24 82.7 kg (182 lb 6.4 oz)                Current Outpatient Medications:     Multiple Vitamin (MULTIVITAMIN ADULT PO), Take 1 tablet by mouth daily (Patient not taking: Reported on 3/13/2024), Disp: , Rfl:     clindamycin (CLEOCIN T) 1 % external solution, , Disp: , Rfl:     doxycycline hyclate (VIBRAMYCIN) 100 MG capsule, Take 1 capsule by mouth daily (Patient not taking: Reported on 4/19/2023), Disp: , Rfl:     acetaminophen (TYLENOL) 325 MG tablet, Take 2 tablets by mouth every 6 hours as needed for Pain (Patient not taking: Reported on 3/13/2024), Disp: , Rfl:     tamsulosin (FLOMAX) 0.4 MG capsule, Take 2 capsules by mouth daily. (Patient not taking: Reported on 2/3/2023), Disp: 60 capsule, Rfl: 5               FALLS RISK SCREEN  Instructions:  Assess the patient and enter the appropriate indicators that are present for fall risk identification.   Total the numbers entered and assign a fall risk score from Table 2.  Reassess patient at a minimum every 12 weeks or with status change.    Assessment   Date  7/25/2024     1.  Mental Ability: confusion/cognitively impaired 0     2.  Elimination Issues: incontinence, frequency 0       3.  Ambulatory: use of assistive devices (walker, cane, off-loading devices),        attached to equipment (IV pole, oxygen) 0     4.  Sensory Limitations: dizziness, vertigo, impaired vision 0     5.  Age less than 65        0     6.  Age 65 or greater 0     7.  Medication: diuretics, strong analgesics, hypnotics, sedatives,        antihypertensive agents 0   8.  Falls:  recent history of falls within the last 3 months (not to include slipping or        tripping) 0   TOTAL 0               TABLE 2   Risk Score Risk Level Plan of Care   0-3 Little

## 2024-07-26 NOTE — PROGRESS NOTES
[facility]            Radiation Oncology          [address] [place] [address]        O: [phone]        F: [phone]       [email or web address]           Date of Service: 2024     Location:  [facility] Radiation Oncology,   [address] [place] [id number]   [phone]       RADIATION ONCOLOGY FOLLOW UP NOTE    Patient ID:   Lyle Ordonez  : 1970   MRN: [id number]    DIAGNOSIS:  Cancer Staging   Squamous cell carcinoma of left tonsil (HCC)  Staging form: Pharynx - HPV-Mediated Oropharynx, AJCC 8th Edition  - Clinical stage from 2023: Stage II (cT1, cN2, cM0, p16+) - Signed by Stephen Mckeon MD on 2023      -s/p tonsilectomy and LND @ [facility]/6/[facility]   -s/p adj RT H&N 60Gy 23    INTERVAL HISTORY:   Lyle Ordonez is a 54 y.o.. male with diagnosis of left sided tonsillar squamous cell carcinoma p16 positive status post surgical resection at [facility] followed by adjuvant radiation therapy to a dose of 6000 cGy completed on 2023.  Patient presents for a posttreatment follow-up today approximately 14 months after finishing.  He notes his xerostomia is improving though still requires water.  He notes his taste is returned mostly to baseline though still has occasional issues.  He does note some tightness in the neck and numbness along his incision.  He has some tightness in his jaw range of motion though is able to eat regular foods. He did have a recent PET scan on 2023 which showed no evidence of any abnormal uptake.  He had a CT chest on 2024   Which showed no evidence of metastatic disease in the thorax.  He did see his ENT who did laryngoscopic exam showing no abnormalities.  Patient is getting circulating tumor DNA testing which was negative on May 6, 2024.  He does follow with his dentist and continues to use his fluoride trays.  His TSH on 2024 was within

## 2024-10-09 ENCOUNTER — OFFICE VISIT (OUTPATIENT)
Dept: ONCOLOGY | Age: 54
End: 2024-10-09
Payer: COMMERCIAL

## 2024-10-09 ENCOUNTER — HOSPITAL ENCOUNTER (OUTPATIENT)
Age: 54
Discharge: HOME | End: 2024-10-09
Payer: COMMERCIAL

## 2024-10-09 ENCOUNTER — TELEPHONE (OUTPATIENT)
Dept: ONCOLOGY | Age: 54
End: 2024-10-09

## 2024-10-09 VITALS
SYSTOLIC BLOOD PRESSURE: 117 MMHG | HEART RATE: 49 BPM | DIASTOLIC BLOOD PRESSURE: 76 MMHG | BODY MASS INDEX: 28.99 KG/M2 | WEIGHT: 185.1 LBS | RESPIRATION RATE: 18 BRPM | TEMPERATURE: 96.8 F | OXYGEN SATURATION: 98 %

## 2024-10-09 DIAGNOSIS — N18.1 CKD (CHRONIC KIDNEY DISEASE), STAGE I: ICD-10-CM

## 2024-10-09 DIAGNOSIS — R93.89 ABNORMAL CT OF THE CHEST: ICD-10-CM

## 2024-10-09 DIAGNOSIS — C09.9 SQUAMOUS CELL CARCINOMA OF LEFT TONSIL (HCC): ICD-10-CM

## 2024-10-09 DIAGNOSIS — C09.9 SQUAMOUS CELL CARCINOMA OF LEFT TONSIL (HCC): Primary | ICD-10-CM

## 2024-10-09 DIAGNOSIS — J02.9 SORE THROAT: ICD-10-CM

## 2024-10-09 LAB
TESTOST SERPL-MCNC: 505 NG/DL (ref 193–740)
TSH SERPL DL<=0.05 MIU/L-ACNC: 3.84 UIU/ML (ref 0.3–5)

## 2024-10-09 PROCEDURE — 99214 OFFICE O/P EST MOD 30 MIN: CPT | Performed by: INTERNAL MEDICINE

## 2024-10-09 PROCEDURE — 84403 ASSAY OF TOTAL TESTOSTERONE: CPT

## 2024-10-09 PROCEDURE — 84443 ASSAY THYROID STIM HORMONE: CPT

## 2024-10-09 PROCEDURE — 99211 OFF/OP EST MAY X REQ PHY/QHP: CPT | Performed by: INTERNAL MEDICINE

## 2024-10-09 PROCEDURE — 36415 COLL VENOUS BLD VENIPUNCTURE: CPT

## 2024-10-09 NOTE — PROGRESS NOTES
Patient ID: Lyle Ordonez, 1970, [id number], 54 y.o.  Referred by :  No ref. 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 male.  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  Congenital second branchial cleft cyst is in the differential   diagnosis only if the lesion has been present for years.       Mild asymmetric enlargement of the left palatine tonsil without measurable   primary mass.           PET/CT on February 8, 2023    1.  Asymmetric radiotracer metabolically active focus in the left tonsillar   area attenuating the left vallecula, consistent with neoplasia.       2.  Cystic left neck mass is noted, photopenic centrally without FDG uptake.   Walls are nodular and demonstrate areas of mild to moderate FDG uptake.   Findings likely related to a necrotic metabolically active lymph node.   Location adjacent to tonsillar metabolic lesion suspicious for metastasis.   No other areas of metabolic activity noted within the cervical lymph nodes.       3.  Congenital absence of right kidney.  Small calcification in the

## 2024-10-09 NOTE — TELEPHONE ENCOUNTER
Instructions   from Dr. Caroline Bartlett MD    Rtc in 6 months with labs      RV 4/9/25 at 8 am  Labs to be done the week before

## 2024-11-08 PROBLEM — J02.9 SORE THROAT: Status: RESOLVED | Noted: 2024-10-09 | Resolved: 2024-11-08

## 2025-01-07 ENCOUNTER — TELEPHONE (OUTPATIENT)
Dept: INFUSION THERAPY | Age: 55
End: 2025-01-07

## 2025-01-07 NOTE — TELEPHONE ENCOUNTER
Pt's wife called stating pt has a lump on his abd. She states he thinks he has had this for a long time, but would like this evaluated. Pt scheduled for Dr. Dhruv maria on Wed 1/15 at 3:45.

## 2025-01-15 ENCOUNTER — HOSPITAL ENCOUNTER (OUTPATIENT)
Age: 55
Discharge: HOME | End: 2025-01-15
Payer: COMMERCIAL

## 2025-01-15 ENCOUNTER — TELEPHONE (OUTPATIENT)
Dept: ONCOLOGY | Age: 55
End: 2025-01-15

## 2025-01-15 ENCOUNTER — OFFICE VISIT (OUTPATIENT)
Dept: ONCOLOGY | Age: 55
End: 2025-01-15
Payer: COMMERCIAL

## 2025-01-15 VITALS
BODY MASS INDEX: 30.48 KG/M2 | TEMPERATURE: 96.9 F | HEART RATE: 56 BPM | OXYGEN SATURATION: 97 % | SYSTOLIC BLOOD PRESSURE: 118 MMHG | WEIGHT: 194.2 LBS | HEIGHT: 67 IN | RESPIRATION RATE: 14 BRPM | DIASTOLIC BLOOD PRESSURE: 72 MMHG

## 2025-01-15 DIAGNOSIS — R53.83 OTHER FATIGUE: ICD-10-CM

## 2025-01-15 DIAGNOSIS — C09.9 SQUAMOUS CELL CARCINOMA OF LEFT TONSIL (HCC): ICD-10-CM

## 2025-01-15 DIAGNOSIS — R19.07 GENERALIZED ABDOMINAL MASS: ICD-10-CM

## 2025-01-15 DIAGNOSIS — Z85.89 HISTORY OF HEAD AND NECK CANCER: ICD-10-CM

## 2025-01-15 DIAGNOSIS — R19.07 GENERALIZED ABDOMINAL MASS: Primary | ICD-10-CM

## 2025-01-15 LAB
ALBUMIN SERPL-MCNC: 4.7 G/DL (ref 3.5–5.2)
ALBUMIN/GLOB SERPL: 1.7 {RATIO} (ref 1–2.5)
ALP SERPL-CCNC: 76 U/L (ref 40–129)
ALT SERPL-CCNC: 20 U/L (ref 10–50)
ANION GAP SERPL CALCULATED.3IONS-SCNC: 8 MMOL/L (ref 9–16)
AST SERPL-CCNC: 21 U/L (ref 10–50)
BASOPHILS # BLD: 0.06 K/UL (ref 0–0.2)
BASOPHILS NFR BLD: 1 % (ref 0–2)
BILIRUB SERPL-MCNC: 0.2 MG/DL (ref 0–1.2)
BUN SERPL-MCNC: 14 MG/DL (ref 6–20)
CALCIUM SERPL-MCNC: 9.7 MG/DL (ref 8.6–10.4)
CHLORIDE SERPL-SCNC: 102 MMOL/L (ref 98–107)
CO2 SERPL-SCNC: 28 MMOL/L (ref 20–31)
CREAT SERPL-MCNC: 1.2 MG/DL (ref 0.7–1.2)
EOSINOPHIL # BLD: 0.12 K/UL (ref 0–0.4)
EOSINOPHILS RELATIVE PERCENT: 2 % (ref 1–4)
ERYTHROCYTE [DISTWIDTH] IN BLOOD BY AUTOMATED COUNT: 14.1 % (ref 11.8–14.4)
GFR, ESTIMATED: 72 ML/MIN/1.73M2
GLUCOSE SERPL-MCNC: 77 MG/DL (ref 74–99)
HCT VFR BLD AUTO: 47.2 % (ref 40.7–50.3)
HGB BLD-MCNC: 14.9 G/DL (ref 13–17)
IMM GRANULOCYTES # BLD AUTO: 0 K/UL (ref 0–0.3)
IMM GRANULOCYTES NFR BLD: 0 %
LYMPHOCYTES NFR BLD: 0.7 K/UL (ref 1–4.8)
LYMPHOCYTES RELATIVE PERCENT: 12 % (ref 24–44)
MCH RBC QN AUTO: 29 PG (ref 25.2–33.5)
MCHC RBC AUTO-ENTMCNC: 31.6 G/DL (ref 28.4–34.8)
MCV RBC AUTO: 91.8 FL (ref 82.6–102.9)
MONOCYTES NFR BLD: 0.75 K/UL (ref 0.1–0.8)
MONOCYTES NFR BLD: 13 % (ref 1–7)
MORPHOLOGY: NORMAL
NEUTROPHILS NFR BLD: 72 % (ref 36–66)
NEUTS SEG NFR BLD: 4.17 K/UL (ref 1.8–7.7)
NRBC BLD-RTO: 0 PER 100 WBC
PLATELET # BLD AUTO: 233 K/UL (ref 138–453)
PMV BLD AUTO: 10.5 FL (ref 8.1–13.5)
POTASSIUM SERPL-SCNC: 4.6 MMOL/L (ref 3.7–5.3)
PROT SERPL-MCNC: 7.5 G/DL (ref 6.6–8.7)
RBC # BLD AUTO: 5.14 M/UL (ref 4.21–5.77)
SODIUM SERPL-SCNC: 138 MMOL/L (ref 136–145)
WBC OTHER # BLD: 5.8 K/UL (ref 3.5–11.3)

## 2025-01-15 PROCEDURE — 80053 COMPREHEN METABOLIC PANEL: CPT

## 2025-01-15 PROCEDURE — 36415 COLL VENOUS BLD VENIPUNCTURE: CPT

## 2025-01-15 PROCEDURE — 99211 OFF/OP EST MAY X REQ PHY/QHP: CPT | Performed by: INTERNAL MEDICINE

## 2025-01-15 PROCEDURE — 99214 OFFICE O/P EST MOD 30 MIN: CPT | Performed by: INTERNAL MEDICINE

## 2025-01-15 PROCEDURE — 85025 COMPLETE CBC W/AUTO DIFF WBC: CPT

## 2025-01-15 NOTE — TELEPHONE ENCOUNTER
Instructions   from Dr. Caroline Bartlett MD    Pet scan  Rtc in 12 months unless positive scan    Pet scan scheduled on 1/23/2025 @8am at [facility]  RV scheduled on 12/10/2025 @8am; gave pt a copy of their order, their labs, and their AVS

## 2025-01-15 NOTE — PROGRESS NOTES
with ENT for circulating tumor DNA               Patient's conditions were taken into consideration when deciding risk-benefit for therapy.  Patient is at high risk for drug interaction risk of complications.  Given multiple comorbid conditions patient is at high risk for complication from intervention, risk of hospitalization, medical interaction overall life expectancy.  NCCN guidelines were reviewed and discussed with the patient.  The diagnosis and care plan were discussed with the patient in detail. I discussed the natural history of the disease, prognosis, risks and goals of therapy and answered all the patients questions to the best of my ability.  Patient expressed understanding and was in agreement.                                      Caroline Bartlett MD                          [facility]                            This note is created with the assistance of a speech recognition program.  While intending to generate a document that actually reflects the content of the visit, the document can still have some errors including those of syntax and sound a like substitutions which may escape proof reading.  It such instances, actual meaning can be extrapolated by contextual diversion.

## 2025-01-22 ENCOUNTER — TELEPHONE (OUTPATIENT)
Dept: ONCOLOGY | Age: 55
End: 2025-01-22

## 2025-01-22 ENCOUNTER — TELEPHONE (OUTPATIENT)
Dept: INFUSION THERAPY | Age: 55
End: 2025-01-22

## 2025-01-22 DIAGNOSIS — R59.0 CERVICAL LYMPHADENOPATHY: ICD-10-CM

## 2025-01-22 DIAGNOSIS — Z85.89 HISTORY OF HEAD AND NECK CANCER: ICD-10-CM

## 2025-01-22 DIAGNOSIS — C09.9 SQUAMOUS CELL CARCINOMA OF LEFT TONSIL (HCC): Primary | ICD-10-CM

## 2025-01-22 DIAGNOSIS — Z90.5 SINGLE KIDNEY: ICD-10-CM

## 2025-01-22 NOTE — TELEPHONE ENCOUNTER
The PET  CT has been denied by the insurance and the reasons are as follows:        Peer to peer can be performed at [phone] tracking# [id number]. The denial letter has been scanned into the pt's chart. Pt currently on the schedule for the PET CT tomorrow 1/23

## 2025-01-22 NOTE — TELEPHONE ENCOUNTER
Received message from precert stating PET requires P2P. Notified Dr. Bartlett and he states to change order to CT abd wo contrast. Order placed and writer left  for pt notifying him of above. Provided central schedule phone number and advised for pt to cancel PET and schedule CT instead.

## 2025-01-23 ENCOUNTER — TELEPHONE (OUTPATIENT)
Dept: INFUSION THERAPY | Age: 55
End: 2025-01-23

## 2025-01-23 NOTE — TELEPHONE ENCOUNTER
Patient called PET was denied so Dr. Bartlett ordered a CT abdomen for the patient.  He called asking if the CT can be of his neck and abdomen?  CRYSTAL arevalo served Dr. Bartlett regarding above.

## 2025-01-30 DIAGNOSIS — R93.89 ABNORMAL CT OF THE CHEST: ICD-10-CM

## 2025-01-30 DIAGNOSIS — C09.9 SQUAMOUS CELL CARCINOMA OF LEFT TONSIL (HCC): Primary | ICD-10-CM

## 2025-01-30 DIAGNOSIS — K76.89 LIVER CYST: ICD-10-CM

## 2025-02-07 DIAGNOSIS — C09.9 SQUAMOUS CELL CARCINOMA OF LEFT TONSIL (HCC): Primary | ICD-10-CM

## 2025-02-07 DIAGNOSIS — R93.89 ABNORMAL CT OF THE CHEST: ICD-10-CM

## 2025-02-19 ENCOUNTER — TELEPHONE (OUTPATIENT)
Dept: INFUSION THERAPY | Age: 55
End: 2025-02-19

## 2025-02-19 NOTE — TELEPHONE ENCOUNTER
Pt called stating his CT was denied, and he is asking what Dr. Bartlett would like to do next. Writer reviewed denial letter scanned into Media. Will route to Dr. Bartlett for review and recommendations.

## 2025-02-20 ENCOUNTER — HOSPITAL ENCOUNTER (OUTPATIENT)
Dept: CT IMAGING | Age: 55
Discharge: HOME | End: 2025-02-22
Attending: INTERNAL MEDICINE
Payer: COMMERCIAL

## 2025-02-20 ENCOUNTER — APPOINTMENT (OUTPATIENT)
Dept: CT IMAGING | Age: 55
End: 2025-02-20
Attending: INTERNAL MEDICINE
Payer: COMMERCIAL

## 2025-02-20 DIAGNOSIS — R93.89 ABNORMAL CT OF THE CHEST: ICD-10-CM

## 2025-02-20 DIAGNOSIS — K76.89 LIVER CYST: ICD-10-CM

## 2025-02-20 DIAGNOSIS — C09.9 SQUAMOUS CELL CARCINOMA OF LEFT TONSIL (HCC): ICD-10-CM

## 2025-02-20 PROCEDURE — 2500000003 HC RX 250 WO HCPCS: Performed by: INTERNAL MEDICINE

## 2025-02-20 PROCEDURE — 6360000004 HC RX CONTRAST MEDICATION: Performed by: INTERNAL MEDICINE

## 2025-02-20 PROCEDURE — 70491 CT SOFT TISSUE NECK W/DYE: CPT

## 2025-02-20 RX ORDER — IOPAMIDOL 755 MG/ML
75 INJECTION, SOLUTION INTRAVASCULAR
Status: COMPLETED | OUTPATIENT
Start: 2025-02-20 | End: 2025-02-20

## 2025-02-20 RX ORDER — 0.9 % SODIUM CHLORIDE 0.9 %
80 INTRAVENOUS SOLUTION INTRAVENOUS ONCE
Status: DISCONTINUED | OUTPATIENT
Start: 2025-02-20 | End: 2025-02-23 | Stop reason: HOSPADM

## 2025-02-20 RX ORDER — SODIUM CHLORIDE 0.9 % (FLUSH) 0.9 %
10 SYRINGE (ML) INJECTION ONCE
Status: COMPLETED | OUTPATIENT
Start: 2025-02-20 | End: 2025-02-20

## 2025-02-20 RX ADMIN — Medication 80 ML: at 08:13

## 2025-02-20 RX ADMIN — SODIUM CHLORIDE, PRESERVATIVE FREE 10 ML: 5 INJECTION INTRAVENOUS at 08:13

## 2025-02-20 RX ADMIN — IOPAMIDOL 75 ML: 755 INJECTION, SOLUTION INTRAVENOUS at 08:13

## 2025-04-04 ENCOUNTER — TELEPHONE (OUTPATIENT)
Dept: INFUSION THERAPY | Age: 55
End: 2025-04-04

## 2025-04-04 DIAGNOSIS — C09.9 SQUAMOUS CELL CARCINOMA OF LEFT TONSIL: Primary | ICD-10-CM

## 2025-04-04 NOTE — TELEPHONE ENCOUNTER
Pt called asking what is going on with the CT scan that was denied. Writer called Appeals line at [phone] and spoke to rep; she states P2P window is closed and pt needs to have abd u/s before abd CT will be approved. Otherwise an appeal can be started. Writer spoke to Dr. Bartlett and he states to order abd u/s. Orders placed and pt notified. Writer provided pt with phone number for scheduling and pt will call today.

## 2025-04-10 ENCOUNTER — HOSPITAL ENCOUNTER (OUTPATIENT)
Dept: ULTRASOUND IMAGING | Age: 55
Discharge: HOME | End: 2025-04-12
Attending: INTERNAL MEDICINE
Payer: COMMERCIAL

## 2025-04-10 DIAGNOSIS — C09.9 SQUAMOUS CELL CARCINOMA OF LEFT TONSIL: ICD-10-CM

## 2025-04-10 PROCEDURE — 76705 ECHO EXAM OF ABDOMEN: CPT

## 2025-05-07 ENCOUNTER — OFFICE VISIT (OUTPATIENT)
Age: 55
End: 2025-05-07
Payer: COMMERCIAL

## 2025-05-07 VITALS
WEIGHT: 189.9 LBS | BODY MASS INDEX: 29.81 KG/M2 | TEMPERATURE: 97.6 F | OXYGEN SATURATION: 97 % | SYSTOLIC BLOOD PRESSURE: 145 MMHG | DIASTOLIC BLOOD PRESSURE: 86 MMHG | HEART RATE: 50 BPM | HEIGHT: 67 IN

## 2025-05-07 DIAGNOSIS — D72.810 LYMPHOCYTOPENIA: ICD-10-CM

## 2025-05-07 DIAGNOSIS — C09.9 SQUAMOUS CELL CARCINOMA OF LEFT TONSIL (HCC): Primary | ICD-10-CM

## 2025-05-07 PROCEDURE — 99214 OFFICE O/P EST MOD 30 MIN: CPT | Performed by: INTERNAL MEDICINE

## 2025-05-07 NOTE — PROGRESS NOTES
just didn't last and we didn't have money to get more.: Patient Declined   Transportation Needs: Patient Declined (5/1/2025)    Received from [facility]    PRAPARE - Transportation     Lack of Transportation (Medical): Patient declined     Lack of Transportation (Non-Medical): Patient declined   Physical Activity: Not on file   Stress: Not on file   Social Connections: Low Risk  (7/22/2024)    Received from [facility]    Social Isolation     Within the last 12 months, how often do you feel isolated from others?: Never   Intimate Partner Violence: Unknown (7/22/2024)    Received from [facility]    Intimate Partner Violence     Within the last year, have you felt unsafe in your home or been afraid of someone close to you?: No     Humiliation: Not on file     Within the last year, have you been kicked, hit, slapped, or otherwise physically hurt by someone close to you?: No     Within the past 12 months, have you been raped or forced to have any kind of sexual activity by someone?: No   Housing Stability: Patient Declined (5/1/2025)    Received from [facility]    Housing Instability     Are you worried or concerned that in the next two months you may not have stable housing that you own, rent or stay in as a part of a household?: Patient Declined       Family History   Problem Relation Age of Onset    Cancer Father         REVIEW OF SYSTEM:     Constitutional: No fever or chills. No night sweats, no weight loss   Eyes: No eye discharge, double vision, or eye pain   HEENT: negative for sore mouth, sore throat, hoarseness and voice change   Respiratory: negative for cough , sputum, dyspnea, wheezing, hemoptysis, chest pain   Cardiovascular: negative for chest pain, dyspnea, palpitations, orthopnea, PND   Gastrointestinal: negative for nausea, vomiting, diarrhea, constipation, abdominal pain, Dysphagia, hematemesis and hematochezia

## 2025-07-31 ENCOUNTER — HOSPITAL ENCOUNTER (OUTPATIENT)
Dept: RADIATION ONCOLOGY | Age: 55
Discharge: HOME | End: 2025-07-31
Payer: COMMERCIAL

## 2025-07-31 VITALS
DIASTOLIC BLOOD PRESSURE: 78 MMHG | TEMPERATURE: 98.2 F | RESPIRATION RATE: 16 BRPM | HEART RATE: 71 BPM | OXYGEN SATURATION: 98 % | WEIGHT: 186 LBS | BODY MASS INDEX: 29.13 KG/M2 | SYSTOLIC BLOOD PRESSURE: 121 MMHG

## 2025-07-31 PROCEDURE — 99212 OFFICE O/P EST SF 10 MIN: CPT | Performed by: RADIOLOGY

## 2025-07-31 NOTE — PROGRESS NOTES
[facility]            Radiation Oncology          [address] [place] [address]        O: [phone]        F: [phone]       [email or web address]           Date of Service: 2025     Location:  [facility] Radiation Oncology,   [address] [place] [id number]   [phone]       RADIATION ONCOLOGY FOLLOW UP NOTE    Patient ID:   Lyle Ordonez  : 1970   MRN: [id number]    DIAGNOSIS:  Cancer Staging   Squamous cell carcinoma of left tonsil (HCC)  Staging form: Pharynx - HPV-Mediated Oropharynx, AJCC 8th Edition  - Clinical stage from 2023: Stage II (cT1, cN2, cM0, p16+) - Signed by Stephen Mckeon MD on 2023        INTERVAL HISTORY:   Lyle Ordonez is a 55 y.o.. male with diagnosis of left sided tonsillar squamous cell carcinoma p16 positive status post surgical resection at [facility] followed by adjuvant radiation therapy to a dose of 6000 cGy completed on 2023.    Patient returns to the radiation clinic for routine follow-up.  He is doing well.  He is tolerating oral diet.  He continues to report some xerostomia.  He denies dysphagia.  Weight is stable.  He denies any lumps or bumps in the neck.  He has good range of motion in his neck.  He denies trismus or earache.  He has no other complaints    MEDICATIONS:    Current Outpatient Medications:     Multiple Vitamin (MULTIVITAMIN ADULT PO), Take 1 tablet by mouth daily, Disp: , Rfl:     clindamycin (CLEOCIN T) 1 % external solution, , Disp: , Rfl:     doxycycline hyclate (VIBRAMYCIN) 100 MG capsule, Take 1 capsule by mouth daily, Disp: , Rfl:     acetaminophen (TYLENOL) 325 MG tablet, Take 2 tablets by mouth every 6 hours as needed for Pain (Patient not taking: Reported on 3/13/2024), Disp: , Rfl:     tamsulosin (FLOMAX) 0.4 MG capsule, Take 2 capsules by mouth daily. (Patient not taking: Reported on 2/3/2023), Disp: 60 capsule, Rfl:  ankle

## 2025-07-31 NOTE — PROGRESS NOTES
Lyle Ordonez  7/31/2025  9:03 AM      Vitals:    07/31/25 0839   BP: 121/78   Pulse: 71   Resp: 16   Temp: 98.2 °F (36.8 °C)   SpO2: 98%      :     Pain Assessment: None - Denies Pain          Wt Readings from Last 1 Encounters:   07/31/25 84.4 kg (186 lb)                Current Outpatient Medications:     Multiple Vitamin (MULTIVITAMIN ADULT PO), Take 1 tablet by mouth daily, Disp: , Rfl:     clindamycin (CLEOCIN T) 1 % external solution, , Disp: , Rfl:     doxycycline hyclate (VIBRAMYCIN) 100 MG capsule, Take 1 capsule by mouth daily, Disp: , Rfl:     acetaminophen (TYLENOL) 325 MG tablet, Take 2 tablets by mouth every 6 hours as needed for Pain (Patient not taking: Reported on 3/13/2024), Disp: , Rfl:     tamsulosin (FLOMAX) 0.4 MG capsule, Take 2 capsules by mouth daily. (Patient not taking: Reported on 2/3/2023), Disp: 60 capsule, Rfl: 5               FALLS RISK SCREEN  Instructions:  Assess the patient and enter the appropriate indicators that are present for fall risk identification.   Total the numbers entered and assign a fall risk score from Table 2.  Reassess patient at a minimum every 12 weeks or with status change.    Assessment   Date  7/31/2025     1.  Mental Ability: confusion/cognitively impaired 0     2.  Elimination Issues: incontinence, frequency 0       3.  Ambulatory: use of assistive devices (walker, cane, off-loading devices),        attached to equipment (IV pole, oxygen) 0     4.  Sensory Limitations: dizziness, vertigo, impaired vision 0     5.  Age less than 65        0     6.  Age 65 or greater 0     7.  Medication: diuretics, strong analgesics, hypnotics, sedatives,        antihypertensive agents 0   8.  Falls:  recent history of falls within the last 3 months (not to include slipping or        tripping) 0   TOTAL 0               TABLE 2   Risk Score Risk Level Plan of Care   0-3 Little or  No Risk 1.  Provide assistance as indicated for ambulation activities  2.  Reorient